# Patient Record
Sex: MALE | Race: WHITE | NOT HISPANIC OR LATINO | Employment: FULL TIME | URBAN - METROPOLITAN AREA
[De-identification: names, ages, dates, MRNs, and addresses within clinical notes are randomized per-mention and may not be internally consistent; named-entity substitution may affect disease eponyms.]

---

## 2017-02-08 ENCOUNTER — THERAPY VISIT (OUTPATIENT)
Dept: PHYSICAL THERAPY | Facility: CLINIC | Age: 52
End: 2017-02-08
Payer: OTHER MISCELLANEOUS

## 2017-02-08 DIAGNOSIS — M54.2 NECK PAIN: Primary | ICD-10-CM

## 2017-02-08 PROCEDURE — 97112 NEUROMUSCULAR REEDUCATION: CPT | Mod: GP | Performed by: PHYSICAL THERAPIST

## 2017-02-08 PROCEDURE — 97162 PT EVAL MOD COMPLEX 30 MIN: CPT | Mod: GP | Performed by: PHYSICAL THERAPIST

## 2017-02-08 PROCEDURE — 97110 THERAPEUTIC EXERCISES: CPT | Mod: GP | Performed by: PHYSICAL THERAPIST

## 2017-02-08 NOTE — PROGRESS NOTES
[facility] Initial Evaluation -- Cervical    Evaluation Date: February 8, 2017  Tim Her is a 51 year old male with a cervical spine condition.   Referral: Ortho  Work mechanical stresses: [place]  - sitting, driving, lifting/carrying  Employment status: Light duty, 4 hrs days, 30 lb lifting restriction and no lifting overhead  Leisure mechanical stresses: Walks 1 hr daily  Functional disability score (NDI):  See NDI  VAS score (0-10): 3-8/10 varying intensity    HISTORY:    Present symptoms:  (L) neck/scapula pain, numbness lateral arm to forearm, tingling 4th/5th fingers  Pain quality (sharp/shooting/stabbing/aching/burning/cramping):   aching.  Paresthesia (yes/no):  Yes    Present since: 9/13/16.    Symptoms (improving/unchanging/worsening):  unchanging.  Symptoms commenced as a result of: Road repairs at work   Condition occurred in the following environment:  work    Symptoms at onset (neck/arm/forearm/headache): neck  Constant symptoms (neck/arm/forearm/headache): arm/forearm hand tingling  Intermittent symptoms (neck/arm/forearm/headache): neck    Symptoms are made worse with the following: Always Turning and Sometimes Lying   Symptoms are made better with the following: chiropractic, heat/ice    Disturbed sleep (yes/no): No  Number of pillows: 1  Sleeping postures (prone/sup/side R/L): sides    Previous episodes (0/1-5/6-10/11+): 0 Year of first episode: NA    Previous history: No  Previous treatments: Chiropractic - minimal relief; DEIDRA - NE.    Specific Questions: (as reported by the patient)  Dizziness/Tinnitus/Nausea/Swallowing (pos/neg): Neg  Gait/Upper Limbs (normal/abnormal): Normal  Medications (nil/NSAIDS/anlag/steroids/anticoag/other):  Other - High blood pressure  Medical allergies:  None  General health (excellent/good/fair/poor):  Good  Pertinent medical history:  High blood pressure  Imaging (NA/Xray/MRI):  MRI  Recent or major surgery (yes/no): None  Night pain  (yes/no): No  Accidents (yes/no): No  Unexplained weight loss (yes/no): No  Barriers at home: None  Other red flags: None    EXAMINATION    Posture:   Sitting (good/fair/poor): Poor  Standing (good/fair/poor): Fair     Protruded head (yes/no): Yes    Wry Neck (right/left/nil):  Nil  Relevant (yes/no):  N     Correction of posture(better/worse/no effect): NE  Other observations:  NA    Neurological:    Motor Deficit:  WNL, except (L) thumb ext 4+/5   Reflexes:  WNL  Sensory Deficit:  Diminished (L) C6   Dural signs:  NA    Movement Loss:   Ramone Mod Min Nil Pain   Protrusion    X    Flexion    X (L) neck/scap   Retraction   X  (L) neck/scap   Extension  X   (L) neck/scap   Lateral flexion R    X (L) neck/scap   Lateral flexion L   X  (L) neck/scap   Rotation R    X (L) neck/scap   Rotation L   X  (L) neck/scap     Test Movements:   During: produces, abolishes, increases, decreases, no effect, centralizing, peripheralizing  After: better, worse, no better, no worse, no effect, centralized, peripheralized    Pretest symptoms sitting: (L) arm numbness/tingling 4th/5th fingers    Symptoms During Symptoms After ROM increased ROM decreased No Effect   PRO        Rep PRO        RET No Effect        Rep RET Decreases Better X     RET EXT Decreases and produces central neck/superior scapula       Rep RET EXT Decreases and produces central neck/superior scapula  Better (L) arm and fingers and  No worse neck/scapula X     Pretest symptoms lying:     Symptoms During Symptoms After ROM increased ROM decreased No Effect   RET        Rep RET        RET EXT        Rep RET EXT        If required, pretest symptoms sitting:      Symptoms During Symptoms After ROM increased ROM decreased No Effect   LF-R        Rep LF-R        LF-L        Rep LF-L        ROT-R        Rep ROT-R        ROT-L        Rep ROT-L        FLEX        Rep FLEX            Static Tests:   Protrusion:    Flexion:    Retraction:    Extension (sitting/prone/supine):   "    Other Tests:     Provisional Classification:  Derangement - Asymmetrical, unilateral, symptoms below elbow    Principle of Management:  Education:  Posture, centralization vs peripheralization, \"stop light guide\" w/performance of HEP  Equipment provided:  None.  Using rolled towel for lumbar roll/posture correction  Mechanical therapy (Y/N):  Y   Extension principle:  Ret/Ext x 10 reps every 2 hrs  Lateral principle:    Flexion principle:     Other:      ASSESSMENT/PLAN:    Patient is a 51 year old male with cervical complaints.    Patient has the following significant findings with corresponding treatment plan.                Diagnosis 1:  Neck Pain    Pain -  self management, education, directional preference exercise and home program  Decreased ROM/flexibility - manual therapy, therapeutic exercise and home program  Decreased joint mobility - manual therapy, therapeutic exercise and home program  Decreased strength - therapeutic exercise, therapeutic activities and home program  Impaired muscle performance - neuro re-education and home program  Decreased function - therapeutic activities and home program  Impaired posture - neuro re-education and home program    Therapy Evaluation Codes:   1) History comprised of:   Personal factors that impact the plan of care:      Work status.    Comorbidity factors that impact the plan of care are:      High blood pressure.     Medications impacting care: High blood pressure.  2) Examination of Body Systems comprised of:   Body structures and functions that impact the plan of care:      Cervical spine.   Activity limitations that impact the plan of care are:      Driving, Lifting, Sitting and Working.  3) Clinical presentation characteristics are:   Evolving/Changing.  4) Decision-Making    Moderate complexity using standardized patient assessment instrument and/or measureable assessment of functional outcome.  Cumulative Therapy Evaluation is: Moderate " complexity.    Previous and current functional limitations:  (See Goal Flow Sheet for this information)    Short term and Long term goals: (See Goal Flow Sheet for this information)     Communication ability:  Patient appears to be able to clearly communicate and understand verbal and written communication and follow directions correctly.  Treatment Explanation - The following has been discussed with the patient:   RX ordered/plan of care  Anticipated outcomes  Possible risks and side effects  This patient would benefit from PT intervention to resume normal activities.   Rehab potential is good.    Frequency:  1 X week, once daily  Duration:  for 6 weeks  Discharge Plan:  Achieve all LTG.  Independent in home treatment program.  Reach maximal therapeutic benefit.    Please refer to the daily flowsheet for treatment today, total treatment time and time spent performing 1:1 timed codes.

## 2017-02-13 ENCOUNTER — TELEPHONE (OUTPATIENT)
Dept: OTHER | Facility: CLINIC | Age: 52
End: 2017-02-13

## 2017-02-14 ENCOUNTER — THERAPY VISIT (OUTPATIENT)
Dept: PHYSICAL THERAPY | Facility: CLINIC | Age: 52
End: 2017-02-14
Payer: OTHER MISCELLANEOUS

## 2017-02-14 DIAGNOSIS — M54.2 NECK PAIN: ICD-10-CM

## 2017-02-14 PROCEDURE — 97012 MECHANICAL TRACTION THERAPY: CPT | Mod: GP | Performed by: PHYSICAL THERAPIST

## 2017-02-14 PROCEDURE — 97110 THERAPEUTIC EXERCISES: CPT | Mod: GP | Performed by: PHYSICAL THERAPIST

## 2017-02-23 ENCOUNTER — THERAPY VISIT (OUTPATIENT)
Dept: PHYSICAL THERAPY | Facility: CLINIC | Age: 52
End: 2017-02-23
Payer: OTHER MISCELLANEOUS

## 2017-02-23 DIAGNOSIS — M54.2 NECK PAIN: ICD-10-CM

## 2017-02-23 PROCEDURE — 97012 MECHANICAL TRACTION THERAPY: CPT | Mod: GP | Performed by: PHYSICAL THERAPY ASSISTANT

## 2017-02-23 PROCEDURE — 97110 THERAPEUTIC EXERCISES: CPT | Mod: GP | Performed by: PHYSICAL THERAPY ASSISTANT

## 2017-03-01 ENCOUNTER — THERAPY VISIT (OUTPATIENT)
Dept: PHYSICAL THERAPY | Facility: CLINIC | Age: 52
End: 2017-03-01
Payer: OTHER MISCELLANEOUS

## 2017-03-01 DIAGNOSIS — M54.2 NECK PAIN: ICD-10-CM

## 2017-03-01 PROCEDURE — 97110 THERAPEUTIC EXERCISES: CPT | Mod: GP | Performed by: PHYSICAL THERAPIST

## 2017-03-08 ENCOUNTER — THERAPY VISIT (OUTPATIENT)
Dept: PHYSICAL THERAPY | Facility: CLINIC | Age: 52
End: 2017-03-08
Payer: OTHER MISCELLANEOUS

## 2017-03-08 DIAGNOSIS — M54.2 NECK PAIN: ICD-10-CM

## 2017-03-08 PROCEDURE — 97110 THERAPEUTIC EXERCISES: CPT | Mod: GP | Performed by: PHYSICAL THERAPIST

## 2017-03-15 ENCOUNTER — TELEPHONE (OUTPATIENT)
Dept: PHYSICAL THERAPY | Facility: CLINIC | Age: 52
End: 2017-03-15

## 2017-03-15 ENCOUNTER — THERAPY VISIT (OUTPATIENT)
Dept: PHYSICAL THERAPY | Facility: CLINIC | Age: 52
End: 2017-03-15
Payer: OTHER MISCELLANEOUS

## 2017-03-15 DIAGNOSIS — M54.2 NECK PAIN: ICD-10-CM

## 2017-03-15 PROCEDURE — 97110 THERAPEUTIC EXERCISES: CPT | Mod: GP | Performed by: PHYSICAL THERAPIST

## 2017-03-15 NOTE — LETTER
[facility]YSICAL [facility]ERAP[facility]  [address]   [place] [address]  [phone]    March 15, 2017    Re: Tim Her   :   1965  MRN:  [id number]   REFERRING PHYSICIAN:   Dusty Arora @ [facility] & Katiana Thompson    [facility] PH[facility]    Date of Initial Evaluation:  2017  Visits:    6  Reason for Referral:  Neck pain    PROGRESS  REPORT:  Progress reporting period is from 17 to 03/15/17.       SUBJECTIVE  Subjective changes noted by patient:  Patient reports that there has not been any change in the N/T in his 4-5th digits or his forearm.  Continues to have intermittent pain on (L) side of neck and shoulder.  Overall, feels he has had no progress since coming to therapy.   Continues to perform HEP every 2 hours     Current Pain level: 4/10.  Initial Pain level:  (3-8/10).   Changes in function:  Yes (See Goal flowsheet attached for changes in current functional level).  Adverse reaction to treatment or activity: None    OBJECTIVE  Changes noted in objective findings:  Yes, slight improvement in C/S AROM  C/S AROM:  flex:  wnl (ERP neck/shoulder), ext: min loss (-),  retraction: min loss (ERP neck/shoulder), rot (R):  wnl (-), rot (L): min loss (ERP neck/shoulder), SB (R):  min loss (ERP neck/shoulder), SB (L): min loss (ERP neck/shoulder).    Myotomes:  all 5/5 except for (L) thumb ext: 5-/5.       ASSESSMENT/PLAN  Updated problem list and treatment plan: Diagnosis 1:  Neck Pain  Pain -  manual therapy, self management, education, directional preference exercise and home program  Decreased ROM/flexibility - manual therapy, therapeutic exercise and home program  Decreased strength - therapeutic exercise, therapeutic activities and home program  Decreased function - therapeutic activities and home program  STG/LTGs have been met or progress has been made towards goals:  Yes (See Goal flow sheet completed  today.)  Assessment of Progress: The patient's progress has slowed.  Self Management Plans:  Patient has been instructed in a home treatment program.  Patient is independent in a home treatment program.  Patient  has been instructed in self management of symptoms.      Re: Tim Her   :   1965    ASSESSMENT/PLAN (continued):  Patient is independent in self management of symptoms.  I have re-evaluated this patient and find that the nature, scope, duration and intensity of the therapy is appropriate for the medical condition of the patient.  Tim continues to require the following intervention to meet STG and LTG's:  PT    Recommendations:  This patient would benefit from continued therapy.     Frequency:  1 X week, once daily  Duration:  for 6 weeks    Thank you for your referral.    INQUIRIES        Therapist:   Gauri Worthington DPT, cert MDT  [facility] PHYSICAL THERAPY  [address] [facility] [address]  [facility] [address]  Phone: [phone]  Fax: [phone]

## 2017-03-15 NOTE — PROGRESS NOTES
Subjective:    HPI                    Objective:    System    Physical Exam    General     ROS    Assessment/Plan:      PROGRESS  REPORT    Progress reporting period is from 02/08/17 to 03/15/17.       SUBJECTIVE  Subjective changes noted by patient:  Patient reports that there has not been any change in the N/T in his 4-5th digits or his forearm.  Continues to have intermittent pain on (L) side of neck and shoulder.  Overall, feels he has had no progress since coming to therapy.   Continues to perform HEP every 2 hours     Current Pain level: 4/10.     Initial Pain level:  (3-8/10).   Changes in function:  Yes (See Goal flowsheet attached for changes in current functional level)  Adverse reaction to treatment or activity: None    OBJECTIVE  Changes noted in objective findings:  Yes, slight improvement in C/S AROM  C/S AROM:  flex:  wnl (ERP neck/shoulder), ext: min loss (-),  retraction: min loss (ERP neck/shoulder), rot (R):  wnl (-), rot (L): min loss (ERP neck/shoulder), SB (R):  min loss (ERP neck/shoulder), SB (L): min loss (ERP neck/shoulder).    Myotomes:  all 5/5 except for (L) thumb ext: 5-/5.       ASSESSMENT/PLAN  Updated problem list and treatment plan: Diagnosis 1:  Neck Pain  Pain -  manual therapy, self management, education, directional preference exercise and home program  Decreased ROM/flexibility - manual therapy, therapeutic exercise and home program  Decreased strength - therapeutic exercise, therapeutic activities and home program  Decreased function - therapeutic activities and home program  STG/LTGs have been met or progress has been made towards goals:  Yes (See Goal flow sheet completed today.)  Assessment of Progress: The patient's progress has slowed.  Self Management Plans:  Patient has been instructed in a home treatment program.  Patient is independent in a home treatment program.  Patient  has been instructed in self management of symptoms.  Patient is independent in self management  of symptoms.  I have re-evaluated this patient and find that the nature, scope, duration and intensity of the therapy is appropriate for the medical condition of the patient.  Tim continues to require the following intervention to meet STG and LTG's:  PT    Recommendations:  This patient would benefit from continued therapy.     Frequency:  1 X week, once daily  Duration:  for 6 weeks        Please refer to the daily flowsheet for treatment today, total treatment time and time spent performing 1:1 timed codes.

## 2017-03-22 ENCOUNTER — THERAPY VISIT (OUTPATIENT)
Dept: PHYSICAL THERAPY | Facility: CLINIC | Age: 52
End: 2017-03-22
Payer: OTHER MISCELLANEOUS

## 2017-03-22 DIAGNOSIS — M54.2 NECK PAIN: ICD-10-CM

## 2017-03-22 PROCEDURE — 97110 THERAPEUTIC EXERCISES: CPT | Mod: GP | Performed by: PHYSICAL THERAPIST

## 2017-03-29 ENCOUNTER — THERAPY VISIT (OUTPATIENT)
Dept: PHYSICAL THERAPY | Facility: CLINIC | Age: 52
End: 2017-03-29
Payer: OTHER MISCELLANEOUS

## 2017-03-29 DIAGNOSIS — M54.2 NECK PAIN: ICD-10-CM

## 2017-03-29 PROCEDURE — 97110 THERAPEUTIC EXERCISES: CPT | Mod: GP | Performed by: PHYSICAL THERAPIST

## 2017-05-15 DIAGNOSIS — T78.3XXA ANGIOEDEMA, INITIAL ENCOUNTER: ICD-10-CM

## 2017-05-15 NOTE — TELEPHONE ENCOUNTER
EPINEPHrine (EPIPEN) 0.3 MG/0.3ML injection      Last Written Prescription Date: 12-1-15  Last Fill Quantity: 2,  # refills: 1   Last Office Visit with [facility], [facility] or [facility] prescribing provider: 9-21-16

## 2017-05-16 RX ORDER — EPINEPHRINE 0.3 MG/.3ML
0.3 INJECTION SUBCUTANEOUS
Qty: 0.6 ML | Refills: 1 | Status: SHIPPED | OUTPATIENT
Start: 2017-05-16 | End: 2017-12-29

## 2017-05-17 PROBLEM — M54.2 NECK PAIN: Status: RESOLVED | Noted: 2017-02-08 | Resolved: 2017-05-17

## 2017-10-02 DIAGNOSIS — I10 ESSENTIAL HYPERTENSION WITH GOAL BLOOD PRESSURE LESS THAN 140/90: ICD-10-CM

## 2017-10-02 NOTE — TELEPHONE ENCOUNTER
hydrochlorothiazide (HYDRODIURIL) 25 MG tablet      Last Written Prescription Date: 9-21-16  Last Fill Quantity: 180, # refills: 3  Last Office Visit with [facility], [facility] or [facility] prescribing provider: 9-21-16       Potassium   Date Value Ref Range Status   09/21/2016 3.4 3.4 - 5.3 mmol/L Final     Creatinine   Date Value Ref Range Status   09/21/2016 1.04 0.66 - 1.25 mg/dL Final     BP Readings from Last 3 Encounters:   09/21/16 122/75   02/23/16 121/76   09/29/15 120/78     metoprolol (TOPROL-XL) 25 MG 24 hr tablet      Last Written Prescription Date: 9-21-16  Last Fill Quantity: 90, # refills: 3    Last Office Visit with [facility], [facility] or [facility] prescribing provider:  9-21-16   Future Office Visit:        BP Readings from Last 3 Encounters:   09/21/16 122/75   02/23/16 121/76   09/29/15 120/78

## 2017-10-03 RX ORDER — METOPROLOL SUCCINATE 25 MG/1
TABLET, EXTENDED RELEASE ORAL
Qty: 30 TABLET | Refills: 0 | Status: SHIPPED | OUTPATIENT
Start: 2017-10-03 | End: 2017-10-09

## 2017-10-03 RX ORDER — HYDROCHLOROTHIAZIDE 25 MG/1
TABLET ORAL
Qty: 60 TABLET | Refills: 0 | Status: SHIPPED | OUTPATIENT
Start: 2017-10-03 | End: 2017-10-09

## 2017-10-03 NOTE — TELEPHONE ENCOUNTER
Patient calling regarding refill request. Patient is completely out of hydrochlorothiazide. Patient did schedule annual physical for 12/12/17 while on the phone with TC. Routing as patient calls due to patient being out of medication.

## 2017-10-03 NOTE — TELEPHONE ENCOUNTER
RN still unable to sign under Hovda's name.    Routed to provider whom patient has physical scheduled with.      Alysia Ibanez RN  [facility]

## 2017-10-03 NOTE — TELEPHONE ENCOUNTER
Routing refill request to provider for review/approval because:  Patient needs to be seen because it has been more than 1 year since last office visit.  RN unable to approve as previous prescriber is retired.    Carmelina Dan RN  [facility]

## 2017-10-09 RX ORDER — METOPROLOL SUCCINATE 25 MG/1
25 TABLET, EXTENDED RELEASE ORAL DAILY
Qty: 90 TABLET | Refills: 0 | Status: SHIPPED | OUTPATIENT
Start: 2017-10-09 | End: 2017-12-29

## 2017-10-09 RX ORDER — HYDROCHLOROTHIAZIDE 25 MG/1
50 TABLET ORAL DAILY
Qty: 180 TABLET | Refills: 0 | Status: SHIPPED | OUTPATIENT
Start: 2017-10-09 | End: 2017-12-29

## 2017-10-09 NOTE — TELEPHONE ENCOUNTER
Patient did not receive the 3 month supply he was to get to last until physical scheduled in December. MERCEDEZ Jones

## 2017-12-29 ENCOUNTER — OFFICE VISIT (OUTPATIENT)
Dept: FAMILY MEDICINE | Facility: CLINIC | Age: 52
End: 2017-12-29
Payer: COMMERCIAL

## 2017-12-29 VITALS
SYSTOLIC BLOOD PRESSURE: 130 MMHG | TEMPERATURE: 97 F | WEIGHT: 185 LBS | HEART RATE: 61 BPM | DIASTOLIC BLOOD PRESSURE: 80 MMHG | BODY MASS INDEX: 32.78 KG/M2 | OXYGEN SATURATION: 98 %

## 2017-12-29 DIAGNOSIS — Z12.11 SPECIAL SCREENING FOR MALIGNANT NEOPLASMS, COLON: ICD-10-CM

## 2017-12-29 DIAGNOSIS — I10 ESSENTIAL HYPERTENSION WITH GOAL BLOOD PRESSURE LESS THAN 140/90: ICD-10-CM

## 2017-12-29 DIAGNOSIS — R97.20 ELEVATED PROSTATE SPECIFIC ANTIGEN (PSA): ICD-10-CM

## 2017-12-29 DIAGNOSIS — I10 HYPERTENSION GOAL BP (BLOOD PRESSURE) < 140/90: Primary | ICD-10-CM

## 2017-12-29 DIAGNOSIS — E78.5 HYPERLIPIDEMIA WITH TARGET LDL LESS THAN 130: ICD-10-CM

## 2017-12-29 DIAGNOSIS — T78.3XXA ANGIOEDEMA, INITIAL ENCOUNTER: ICD-10-CM

## 2017-12-29 DIAGNOSIS — Z11.59 ENCOUNTER FOR HCV SCREENING TEST FOR LOW RISK PATIENT: ICD-10-CM

## 2017-12-29 LAB
ALT SERPL W P-5'-P-CCNC: 30 U/L (ref 0–70)
ANION GAP SERPL CALCULATED.3IONS-SCNC: 9 MMOL/L (ref 3–14)
AST SERPL W P-5'-P-CCNC: 23 U/L (ref 0–45)
BUN SERPL-MCNC: 17 MG/DL (ref 7–30)
CALCIUM SERPL-MCNC: 9.6 MG/DL (ref 8.5–10.1)
CHLORIDE SERPL-SCNC: 100 MMOL/L (ref 94–109)
CHOLEST SERPL-MCNC: 219 MG/DL
CK SERPL-CCNC: 71 U/L (ref 30–300)
CO2 SERPL-SCNC: 32 MMOL/L (ref 20–32)
CREAT SERPL-MCNC: 0.95 MG/DL (ref 0.66–1.25)
GFR SERPL CREATININE-BSD FRML MDRD: 83 ML/MIN/1.7M2
GLUCOSE SERPL-MCNC: 102 MG/DL (ref 70–99)
HDLC SERPL-MCNC: 67 MG/DL
LDLC SERPL CALC-MCNC: 128 MG/DL
NONHDLC SERPL-MCNC: 152 MG/DL
POTASSIUM SERPL-SCNC: 3.6 MMOL/L (ref 3.4–5.3)
PSA SERPL-ACNC: 0.9 UG/L (ref 0–4)
SODIUM SERPL-SCNC: 141 MMOL/L (ref 133–144)
TRIGL SERPL-MCNC: 122 MG/DL

## 2017-12-29 PROCEDURE — 80048 BASIC METABOLIC PNL TOTAL CA: CPT | Performed by: FAMILY MEDICINE

## 2017-12-29 PROCEDURE — 86803 HEPATITIS C AB TEST: CPT | Performed by: FAMILY MEDICINE

## 2017-12-29 PROCEDURE — 84450 TRANSFERASE (AST) (SGOT): CPT | Performed by: FAMILY MEDICINE

## 2017-12-29 PROCEDURE — 82550 ASSAY OF CK (CPK): CPT | Performed by: FAMILY MEDICINE

## 2017-12-29 PROCEDURE — 84460 ALANINE AMINO (ALT) (SGPT): CPT | Performed by: FAMILY MEDICINE

## 2017-12-29 PROCEDURE — 36415 COLL VENOUS BLD VENIPUNCTURE: CPT | Performed by: FAMILY MEDICINE

## 2017-12-29 PROCEDURE — G0103 PSA SCREENING: HCPCS | Performed by: FAMILY MEDICINE

## 2017-12-29 PROCEDURE — 99213 OFFICE O/P EST LOW 20 MIN: CPT | Performed by: FAMILY MEDICINE

## 2017-12-29 PROCEDURE — 80061 LIPID PANEL: CPT | Performed by: FAMILY MEDICINE

## 2017-12-29 RX ORDER — HYDROCHLOROTHIAZIDE 25 MG/1
50 TABLET ORAL DAILY
Qty: 180 TABLET | Refills: 3 | Status: SHIPPED | OUTPATIENT
Start: 2017-12-29 | End: 2019-01-18

## 2017-12-29 RX ORDER — EPINEPHRINE 0.3 MG/.3ML
0.3 INJECTION SUBCUTANEOUS
Qty: 0.6 ML | Refills: 1 | Status: SHIPPED | OUTPATIENT
Start: 2017-12-29 | End: 2020-02-07

## 2017-12-29 RX ORDER — METOPROLOL SUCCINATE 25 MG/1
25 TABLET, EXTENDED RELEASE ORAL DAILY
Qty: 90 TABLET | Refills: 3 | Status: SHIPPED | OUTPATIENT
Start: 2017-12-29 | End: 2019-01-18

## 2017-12-29 NOTE — LETTER
[facility]   [address] [place]  [address]  [phone]                                   January 2, 2018    Tim Her  [address] [facility] [address]        Dear Tim,    Your basic metabolic panel which includes electrolytes,kidney function is normal and  -Glucose (diabetic screening test) is within normal limits, blood sugar was minimally elevated   Your cholesterols are ok   Your ldl is under 130 which is your goal   Your prostate test is now normal   Your liver tests and muscle enzyme tests are normal     Hepatitis c testing results are not back and we do not have your FIT results for the stool     Follow up in 6 month(s)    Results for orders placed or performed in visit on 12/29/17   Basic metabolic panel   Result Value Ref Range    Sodium 141 133 - 144 mmol/L    Potassium 3.6 3.4 - 5.3 mmol/L    Chloride 100 94 - 109 mmol/L    Carbon Dioxide 32 20 - 32 mmol/L    Anion Gap 9 3 - 14 mmol/L    Glucose 102 (H) 70 - 99 mg/dL    Urea Nitrogen 17 7 - 30 mg/dL    Creatinine 0.95 0.66 - 1.25 mg/dL    GFR Estimate 83 >60 mL/min/1.7m2    GFR Estimate If Black >90 >60 mL/min/1.7m2    Calcium 9.6 8.5 - 10.1 mg/dL   AST   Result Value Ref Range    AST 23 0 - 45 U/L   Lipid panel reflex to direct LDL Fasting   Result Value Ref Range    Cholesterol 219 (H) <200 mg/dL    Triglycerides 122 <150 mg/dL    HDL Cholesterol 67 >39 mg/dL    LDL Cholesterol Calculated 128 (H) <100 mg/dL    Non HDL Cholesterol 152 (H) <130 mg/dL   ALT   Result Value Ref Range    ALT 30 0 - 70 U/L   CK total   Result Value Ref Range    CK Total 71 30 - 300 U/L   Hepatitis C Screen Reflex to HCV RNA Quant and Genotype   Result Value Ref Range    Hepatitis C Antibody Nonreactive NR^Nonreactive   PSA, screen   Result Value Ref Range    PSA 0.90 0 - 4 ug/L       If you have any questions please call the clinic at [phone]    Sincerely,    Wisam Sylvester MD  bmd

## 2017-12-29 NOTE — PROGRESS NOTES
SUBJECTIVE:   Tim Her is a 52 year old male who presents to clinic today for the following health issues:      Hypertension Follow-up      Outpatient blood pressures are not being checked.    Low Salt Diet: no added salt        Amount of exercise or physical activity: Core strengthening 3 times a week and walking    Problems taking medications regularly: No    Medication side effects: none  Diet: no salt added    Ros: no chest pain, chest tightness   No sob   No leg edema   No abdominal pain     Denies fever or chills   Weight stable        No stroke or neurological symptoms     Current Outpatient Prescriptions   Medication Sig Dispense Refill     hydrochlorothiazide (HYDRODIURIL) 25 MG tablet Take 2 tablets (50 mg) by mouth daily 180 tablet 0     metoprolol (TOPROL-XL) 25 MG 24 hr tablet Take 1 tablet (25 mg) by mouth daily 90 tablet 0     amLODIPine (NORVASC) 10 MG tablet Take 1 tablet (10 mg) by mouth daily 90 tablet 3     Omega-3 Fatty Acids (OMEGA-3 FISH OIL PO) Take 2 capsules by mouth every morning and 1 capsules by mouth every evening       aspirin 81 MG tablet Take 1 tablet (81 mg) by mouth daily (Patient taking differently: Take 81 mg by mouth every evening ) 90 tablet 3     MULTI VITAMIN MENS PO TABS 1 TABLETby mouth every morning       EPINEPHrine 0.3 MG/0.3ML injection Inject 0.3 mLs (0.3 mg) into the muscle once as needed for anaphylaxis (Patient not taking: Reported on 12/29/2017) 0.6 mL 1     clotrimazole-betamethasone (LOTRISONE) cream Apply topically 2 times daily (Patient not taking: Reported on 12/29/2017) 15 g 1     Patient was having hemorrhoids when he had his FIT test done lat year and so he did not follow through with that     PSA   Date Value Ref Range Status   09/21/2016 1.75 0 - 4 ug/L Final     Comment:     Assay Method:  Chemiluminescence using Siemens Vista analyzer           O: /80 (BP Location: Left arm, Patient Position: Chair, Cuff Size: Adult Regular)  Pulse 61   Temp 97  F (36.1  C) (Oral)  Wt 185 lb (83.9 kg)  SpO2 98%  BMI 32.78 kg/m2    Chest wall normal to inspection and palpation. Good excursion bilaterally. Lungs clear to auscultation. Good air movement bilaterally without rales, wheezes, or rhonchi.   Regular rate and  rhythm. S1 and S2 normal, no murmurs, clicks, gallops or rubs. No edema or JVD.      ICD-10-CM    1. Hypertension goal BP (blood pressure) < 140/90 I10    2. Hyperlipidemia with target LDL less than 130 E78.5    3. Elevated prostate specific antigen (PSA) R97.20    4. Special screening for malignant neoplasms, colon Z12.11        Wt Readings from Last 4 Encounters:   12/29/17 185 lb (83.9 kg)   09/21/16 192 lb (87.1 kg)   02/23/16 198 lb (89.8 kg)   09/29/15 190 lb 8 oz (86.4 kg)

## 2017-12-29 NOTE — MR AVS SNAPSHOT
"              After Visit Summary   12/29/2017    Tim Her    MRN: [id number]           Patient Information     Date Of Birth          1965        Visit Information        Provider Department      12/29/2017 10:20 AM Wisam Sylvester MD [facility]        Today's Diagnoses     Hypertension goal BP (blood pressure) < 140/90    -  1    Hyperlipidemia with target LDL less than 130        Elevated prostate specific antigen (PSA)        Special screening for malignant neoplasms, colon        Essential hypertension with goal blood pressure less than 140/90        Encounter for HCV screening test for low risk patient        Angioedema, initial encounter           Follow-ups after your visit        Future tests that were ordered for you today     Open Future Orders        Priority Expected Expires Ordered    Fecal colorectal cancer screen (FIT) Routine 1/19/2018 3/23/2018 12/29/2017            Who to contact     If you have questions or need follow up information about today's clinic visit or your schedule please contact [facility] directly at [phone].  Normal or non-critical lab and imaging results will be communicated to you by MyChart, letter or phone within 4 business days after the clinic has received the results. If you do not hear from us within 7 days, please contact the clinic through [employer] or phone. If you have a critical or abnormal lab result, we will notify you by phone as soon as possible.  Submit refill requests through [employer] or call your pharmacy and they will forward the refill request to us. Please allow 3 business days for your refill to be completed.          Additional Information About Your Visit        [employer] Information     [employer] lets you send messages to your doctor, view your test results, renew your prescriptions, schedule appointments and more. To sign up, go to [email or web address] . Click on \"Log in\" on the left side " "of the screen, which will take you to the Welcome page. Then click on \"Sign up Now\" on the right side of the page.     You will be asked to enter the access code listed below, as well as some personal information. Please follow the directions to create your username and password.     Your access code is: [email or web address]  Expires: 3/19/2018 11:29 AM     Your access code will  in 90 days. If you need help or a new code, please call your [facility] or [phone].        Care EveryWhere ID     This is your Care EveryWhere ID. This could be used by other organizations to access your [facility] medical records  [id number]        Your Vitals Were     Pulse Temperature Pulse Oximetry BMI (Body Mass Index)          61 97  F (36.1  C) (Oral) 98% 32.78 kg/m2         Blood Pressure from Last 3 Encounters:   17 130/80   16 122/75   16 121/76    Weight from Last 3 Encounters:   17 185 lb (83.9 kg)   16 192 lb (87.1 kg)   16 198 lb (89.8 kg)              We Performed the Following     ALT     AST     Basic metabolic panel     CK total     Hepatitis C Screen Reflex to HCV RNA Quant and Genotype     Lipid panel reflex to direct LDL Fasting     PSA, screen          Today's Medication Changes          These changes are accurate as of: 17 10:47 AM.  If you have any questions, ask your nurse or doctor.               These medicines have changed or have updated prescriptions.        Dose/Directions    aspirin 81 MG tablet   This may have changed:  when to take this   Used for:  Routine general medical examination at a health care facility        Dose:  81 mg   Take 1 tablet (81 mg) by mouth daily   Quantity:  90 tablet   Refills:  3            Where to get your medicines      These medications were sent to [facility] [address] [place] [address]     Phone:  [phone]     hydrochlorothiazide 25 MG tablet    metoprolol 25 " MG 24 hr tablet         Some of these will need a paper prescription and others can be bought over the counter.  Ask your nurse if you have questions.     Bring a paper prescription for each of these medications     EPINEPHrine 0.3 MG/0.3ML injection 2-pack                Primary Care Provider Office Phone # Fax #    Zurdo Benites [phone]       [address] [facility] [address]        Equal Access to Services     RIVKA SHETH : Hadii aad ku hadasho Soomaali, waaxda luqadaha, qaybta kaalmada adeegyada, waxay idiin hayaan adeeg khmariangel laAshjulien . So [facility] [phone].    ATENCIÓN: Si habla español, tiene a ritter disposición servicios gratuitos de asistencia lingüística. Llame al [phone].    We comply with applicable federal civil rights laws and [place] laws. We do not discriminate on the basis of race, color, national origin, age, disability, sex, sexual orientation, or gender identity.            Thank you!     Thank you for choosing [facility]  for your care. Our goal is always to provide you with excellent care. Hearing back from our patients is one way we can continue to improve our services. Please take a few minutes to complete the written survey that you may receive in the mail after your visit with us. Thank you!             Your Updated Medication List - Protect others around you: Learn how to safely use, store and throw away your medicines at www.disposemymeds.org.          This list is accurate as of: 12/29/17 10:47 AM.  Always use your most recent med list.                   Brand Name Dispense Instructions for use Diagnosis    amLODIPine 10 MG tablet    NORVASC    90 tablet    Take 1 tablet (10 mg) by mouth daily    Essential hypertension with goal blood pressure less than 140/90       aspirin 81 MG tablet     90 tablet    Take 1 tablet (81 mg) by mouth daily    Routine general medical examination at a health care facility        clotrimazole-betamethasone cream    LOTRISONE    15 g    Apply topically 2 times daily    Tinea corporis       EPINEPHrine 0.3 MG/0.3ML injection 2-pack    EPIPEN/ADRENACLICK/or ANY BX GENERIC EQUIV    0.6 mL    Inject 0.3 mLs (0.3 mg) into the muscle once as needed for anaphylaxis    Angioedema, initial encounter       hydrochlorothiazide 25 MG tablet    HYDRODIURIL    180 tablet    Take 2 tablets (50 mg) by mouth daily    Essential hypertension with goal blood pressure less than 140/90       metoprolol 25 MG 24 hr tablet    TOPROL-XL    90 tablet    Take 1 tablet (25 mg) by mouth daily    Essential hypertension with goal blood pressure less than 140/90       MULTI vitamin  MENS Tabs      1 TABLETby mouth every morning        OMEGA-3 FISH OIL PO      Take 2 capsules by mouth every morning and 1 capsules by mouth every evening

## 2017-12-29 NOTE — NURSING NOTE
"Chief Complaint   Patient presents with     Hypertension     Health Maintenance     BMP, FIT       Initial /80 (BP Location: Left arm, Patient Position: Chair, Cuff Size: Adult Regular)  Pulse 61  Temp 97  F (36.1  C) (Oral)  Wt 185 lb (83.9 kg)  SpO2 98%  BMI 32.78 kg/m2 Estimated body mass index is 32.78 kg/(m^2) as calculated from the following:    Height as of 2/23/16: 5' 2.99\" (1.6 m).    Weight as of this encounter: 185 lb (83.9 kg).  Medication Reconciliation: complete   Lori See BRITTA Zhou      "

## 2017-12-29 NOTE — LETTER
[facility]   [address] [place]  [address]  [phone]                                   January 2, 2018    Tim Her  [address] [facility] [address]        Dear Tim,    Your hepatis c test is normal     Results for orders placed or performed in visit on 12/29/17   Basic metabolic panel   Result Value Ref Range    Sodium 141 133 - 144 mmol/L    Potassium 3.6 3.4 - 5.3 mmol/L    Chloride 100 94 - 109 mmol/L    Carbon Dioxide 32 20 - 32 mmol/L    Anion Gap 9 3 - 14 mmol/L    Glucose 102 (H) 70 - 99 mg/dL    Urea Nitrogen 17 7 - 30 mg/dL    Creatinine 0.95 0.66 - 1.25 mg/dL    GFR Estimate 83 >60 mL/min/1.7m2    GFR Estimate If Black >90 >60 mL/min/1.7m2    Calcium 9.6 8.5 - 10.1 mg/dL   AST   Result Value Ref Range    AST 23 0 - 45 U/L   Lipid panel reflex to direct LDL Fasting   Result Value Ref Range    Cholesterol 219 (H) <200 mg/dL    Triglycerides 122 <150 mg/dL    HDL Cholesterol 67 >39 mg/dL    LDL Cholesterol Calculated 128 (H) <100 mg/dL    Non HDL Cholesterol 152 (H) <130 mg/dL   ALT   Result Value Ref Range    ALT 30 0 - 70 U/L   CK total   Result Value Ref Range    CK Total 71 30 - 300 U/L   Hepatitis C Screen Reflex to HCV RNA Quant and Genotype   Result Value Ref Range    Hepatitis C Antibody Nonreactive NR^Nonreactive   PSA, screen   Result Value Ref Range    PSA 0.90 0 - 4 ug/L       If you have any questions please call the clinic at [phone]    Sincerely,    Wisam Sylvester MD  bmd

## 2018-01-02 LAB — HCV AB SERPL QL IA: NONREACTIVE

## 2018-01-02 NOTE — PROGRESS NOTES
Your basic metabolic panel which includes electrolytes,kidney function is normal and  -Glucose (diabetic screening test) is within normal limits, blood sugar was minimally elevated   Your cholesterols are ok   Your ldl is under 130 which is your goal   Your prostate test is now normal   Your liver tests and muscle enzyme tests are normal     Hepatitis c testing results are not back and we do not have your FIT results for the stool     Follow up in 6 month(s)

## 2018-01-19 DIAGNOSIS — Z12.11 SPECIAL SCREENING FOR MALIGNANT NEOPLASMS, COLON: ICD-10-CM

## 2018-01-19 LAB — HEMOCCULT STL QL IA: NEGATIVE

## 2018-01-19 PROCEDURE — 82274 ASSAY TEST FOR BLOOD FECAL: CPT | Performed by: FAMILY MEDICINE

## 2018-01-19 NOTE — LETTER
[facility]   [address] [place]  [address]  [phone]                                   January 22, 2018    Tim Her  [address] [facility] [address]        Dear Tim,    Your FIT testing was normal.   I still recommend that we do colonoscopy since it is the well established screening tool.   There are risks involved with doing a colonoscopy and so since you agreed to do FIT testing, you should continue to do it  yearly for five years.   You need to do it regularly to achieve the same level of confidence as the colonoscopy.  When done correctly, this can be substituted for the colonoscopy  If it turns positive then we would have to proceed with colonoscopy to further evaluate.     In reviewing your chart I see you had a positive FIT test 2 years ago and I am not sure if you had a colonoscopy.  This was recommended to you at the time and it is still recommended if it has not been done, even though your current test is normal.  Bleeding from the colon can be intermittent.  Any positive test requires a follow up colonoscopy unless it is already explained.    Results for orders placed or performed in visit on 01/19/18   Fecal colorectal cancer screen (FIT)   Result Value Ref Range    Occult Blood Scn FIT Negative NEG^Negative       If you have any questions please call the clinic at [phone]    Sincerely,    Wisam Sylvester MD  bmd

## 2018-01-22 NOTE — PROGRESS NOTES
Your FIT testing was normal.   I still recommend that we do colonoscopy since it is the well established screening tool.   There are risks involved with doing a colonoscopy and so since you agreed to do FIT testing, you should continue to do it  yearly for five years.   You need to do it regularly to achieve the same level of confidence as the colonoscopy.  When done correctly, this can be substituted for the colonoscopy  If it turns positive then we would have to proceed with colonoscopy to further evaluate.     In reviewing your chart I see you had a positive FIT test 2 years ago and I am not sure if you had a colonoscopy.  This was recommended to you at the time and it is still recommended if it has not been done, even though your current test is normal.  Bleeding from the colon can be intermittent.  Any positive test requires a follow up colonoscopy unless it is already explained.

## 2018-10-29 ENCOUNTER — TELEPHONE (OUTPATIENT)
Dept: ORTHOPEDICS | Facility: CLINIC | Age: 53
End: 2018-10-29

## 2018-10-29 NOTE — TELEPHONE ENCOUNTER
[facility] Call Center    Phone Message    May a detailed message be left on voicemail: yes    Reason for Call: Other: Pt requests call back either way. Pt wants to know if you do Stem Cell Injections. Please return Pt call. Thanks.      Action Taken: Message routed to:  [facility]opaedic Clinic

## 2018-11-12 NOTE — TELEPHONE ENCOUNTER
Called patient today and there was no answer so a voice mail was left. I informed him that we do not offer stem cell injections here at the [facility].     Omid Irizarry, ATC

## 2018-11-20 ENCOUNTER — TELEPHONE (OUTPATIENT)
Dept: ORTHOPEDICS | Facility: CLINIC | Age: 53
End: 2018-11-20

## 2018-11-20 NOTE — TELEPHONE ENCOUNTER
[facility] Call Center    Phone Message    May a detailed message be left on voicemail: yes    Reason for Call: Patient has questions about stem cell injections    Action Taken: Message routed to:  Adult Clinics: Sports Medicine p [phone]

## 2018-11-20 NOTE — TELEPHONE ENCOUNTER
Called to discuss with patient. Let him know that we currently do not offer stem cell injections here. He is interested in being seen for his neck pain. He does have an upcoming appointment with Dr. Fox on the 28th. He may cancel if he finds a provider that can offer stem cell treatments.

## 2019-01-15 ENCOUNTER — OFFICE VISIT (OUTPATIENT)
Dept: FAMILY MEDICINE | Facility: CLINIC | Age: 54
End: 2019-01-15
Payer: COMMERCIAL

## 2019-01-15 VITALS
WEIGHT: 197 LBS | BODY MASS INDEX: 34.91 KG/M2 | OXYGEN SATURATION: 97 % | DIASTOLIC BLOOD PRESSURE: 86 MMHG | HEART RATE: 62 BPM | SYSTOLIC BLOOD PRESSURE: 138 MMHG | TEMPERATURE: 96.7 F

## 2019-01-15 DIAGNOSIS — G89.29 CHRONIC PAIN OF RIGHT KNEE: Primary | ICD-10-CM

## 2019-01-15 DIAGNOSIS — M25.561 CHRONIC PAIN OF RIGHT KNEE: Primary | ICD-10-CM

## 2019-01-15 PROCEDURE — 99213 OFFICE O/P EST LOW 20 MIN: CPT | Performed by: FAMILY MEDICINE

## 2019-01-15 NOTE — PROGRESS NOTES
SUBJECTIVE:   Tim Her is a 53 year old male who presents to clinic today for the following health issues:    Follow up Right knee pain from about 5 years.  MRI was recommended, never went and now is interested on getting an MRI of his right knee.      Pain in the knee that is worse on some days     Aches on damp days     He is on light duty for the city     Goes to core strengthening   Walks several days a week     Does not run    Stairs work ok   Pain does not stop him from doing anything   No swelling   Does not give out on him     No previous injury   Works in construction     Past Medical History:   Diagnosis Date     DJD (degenerative joint disease)     BACK     High cholesterol      HTN (hypertension)      Hypertension goal BP (blood pressure) < 140/90 9/2/2011     Patient overweight      Seizures (H)        History reviewed. No pertinent surgical history.    Family History   Problem Relation Age of Onset     C.A.D. Father 50        mi, cabg     Hypertension Father      Diabetes No family hx of        Social History     Tobacco Use     Smoking status: Never Smoker     Smokeless tobacco: Never Used   Substance Use Topics     Alcohol use: Yes     Comment: socially         O: /86 (BP Location: Right arm, Patient Position: Sitting, Cuff Size: Adult Large)   Pulse 62   Temp 96.7  F (35.9  C) (Oral)   Wt 89.4 kg (197 lb)   SpO2 97%   BMI 34.91 kg/m      Medication Followup of All    Taking Medication as prescribed: yes    Side Effects:  None    Medication Helping Symptoms:  yes     Full rom of the knee from 0-160  No joint line pain   No swelloing   No warmth   José Miguel is normal     Gait is normal     Previous xray is about 4.5 years old       ICD-10-CM    1. Chronic pain of right knee M25.561     G89.29      Suggested we repeat his xray but he did not want to do this  Discussed MRI, but told him that when this is done it should direct therapy and patient does not want this therapy so we agreed  not to do that now.     Patient does not want to use any meds for this     Reviewed patellar strengthening exercises     Patient will return as needed

## 2019-01-29 ENCOUNTER — TELEPHONE (OUTPATIENT)
Dept: FAMILY MEDICINE | Facility: CLINIC | Age: 54
End: 2019-01-29

## 2019-01-29 DIAGNOSIS — I10 ESSENTIAL HYPERTENSION WITH GOAL BLOOD PRESSURE LESS THAN 140/90: ICD-10-CM

## 2019-01-30 NOTE — TELEPHONE ENCOUNTER
Routing refill request to provider for review/approval because:  Labs not current:  Creatinine.  Please review.  Thank you.  Rosaline Naejra RN

## 2019-01-31 RX ORDER — AMLODIPINE BESYLATE 10 MG/1
TABLET ORAL
Qty: 30 TABLET | Refills: 0 | Status: SHIPPED | OUTPATIENT
Start: 2019-01-31 | End: 2019-02-13

## 2019-01-31 RX ORDER — HYDROCHLOROTHIAZIDE 25 MG/1
TABLET ORAL
Qty: 60 TABLET | Refills: 0 | Status: SHIPPED | OUTPATIENT
Start: 2019-01-31 | End: 2019-02-13

## 2019-02-01 NOTE — TELEPHONE ENCOUNTER
Reason for Call:  Other appointment    Detailed comments: Patient is confused and frustrated about why he is needing another appointment when he just saw Dr. GREER for a medication refill appointment on 1/15. He is wondering if he can do a lab only appointment, and have Dr. GREER send him in more refills after that? Please call patient to advise    Phone Number Patient can be reached at: Home number on file [phone] (home)    Best Time: anytime    Can we leave a detailed message on this number? YES    Call taken on 2/1/2019 at 11:56 AM by Bill Leroy

## 2019-02-01 NOTE — TELEPHONE ENCOUNTER
Patient should be seen in at least yearly for his blood pressure check.  Patient came in for knee exam on the last visit.  Nothing related to his blood pressure was addressed other than his blood pressure was taken that day and was normal.  I would order a BMP.  He should come into the clinic within 6 months for a recheck in the office for a visit for blood pressure control.

## 2019-02-01 NOTE — TELEPHONE ENCOUNTER
Patient is due for a recheck of his bp and labs for the same   I have issued a 1 month supply.  If he fails to make an appointment he will be given a minimal supply to get him in

## 2019-02-01 NOTE — TELEPHONE ENCOUNTER
Notified patient of the message below per PCP.    Patient stated understanding and agreeable with the plan of care. Heidy Corley, RN-BSN, [facility] Triage.

## 2019-02-06 DIAGNOSIS — I10 ESSENTIAL HYPERTENSION WITH GOAL BLOOD PRESSURE LESS THAN 140/90: ICD-10-CM

## 2019-02-06 LAB
ANION GAP SERPL CALCULATED.3IONS-SCNC: 11 MMOL/L (ref 3–14)
BUN SERPL-MCNC: 11 MG/DL (ref 7–30)
CALCIUM SERPL-MCNC: 9 MG/DL (ref 8.5–10.1)
CHLORIDE SERPL-SCNC: 100 MMOL/L (ref 94–109)
CO2 SERPL-SCNC: 28 MMOL/L (ref 20–32)
CREAT SERPL-MCNC: 0.93 MG/DL (ref 0.66–1.25)
GFR SERPL CREATININE-BSD FRML MDRD: >90 ML/MIN/{1.73_M2}
GLUCOSE SERPL-MCNC: 126 MG/DL (ref 70–99)
POTASSIUM SERPL-SCNC: 3.4 MMOL/L (ref 3.4–5.3)
SODIUM SERPL-SCNC: 139 MMOL/L (ref 133–144)

## 2019-02-06 PROCEDURE — 36415 COLL VENOUS BLD VENIPUNCTURE: CPT | Performed by: FAMILY MEDICINE

## 2019-02-06 PROCEDURE — 80048 BASIC METABOLIC PNL TOTAL CA: CPT | Performed by: FAMILY MEDICINE

## 2019-02-06 NOTE — LETTER
[facility] [place]   [address] [place]  [address]  [phone]                                   February 7, 2019    Tim Her  [address] [place] [address]        Dear Tim,    Electrolytes are normal.  Kidney function tests are normal.  Blood glucose is 126 which is in the prediabetic range.  You should return to the clinic for fasting blood specimen to recheck this level.  If it continues to be elevated we might need to put you on medication.  Please do this within the next 2 weeks.    Results for orders placed or performed in visit on 02/06/19   Basic metabolic panel   Result Value Ref Range    Sodium 139 133 - 144 mmol/L    Potassium 3.4 3.4 - 5.3 mmol/L    Chloride 100 94 - 109 mmol/L    Carbon Dioxide 28 20 - 32 mmol/L    Anion Gap 11 3 - 14 mmol/L    Glucose 126 (H) 70 - 99 mg/dL    Urea Nitrogen 11 7 - 30 mg/dL    Creatinine 0.93 0.66 - 1.25 mg/dL    GFR Estimate >90 >60 mL/min/[1.73_m2]    GFR Estimate If Black >90 >60 mL/min/[1.73_m2]    Calcium 9.0 8.5 - 10.1 mg/dL       If you have any questions please call the clinic at [phone]    Sincerely,    Wisam Sylvester MD  bmd

## 2019-02-07 NOTE — RESULT ENCOUNTER NOTE
Electrolytes are normal.  Kidney function tests are normal.  Blood glucose is 126 which is in the prediabetic range.  You should return to the clinic for fasting blood specimen to recheck this level.  If it continues to be elevated we might need to put you on medication.  Please do this within the next 2 weeks.

## 2019-02-13 DIAGNOSIS — I10 ESSENTIAL HYPERTENSION WITH GOAL BLOOD PRESSURE LESS THAN 140/90: ICD-10-CM

## 2019-02-13 RX ORDER — HYDROCHLOROTHIAZIDE 25 MG/1
50 TABLET ORAL DAILY
Qty: 90 TABLET | Refills: 1 | Status: SHIPPED | OUTPATIENT
Start: 2019-02-13 | End: 2019-03-04

## 2019-02-13 RX ORDER — AMLODIPINE BESYLATE 10 MG/1
TABLET ORAL
Qty: 90 TABLET | Refills: 1 | Status: SHIPPED | OUTPATIENT
Start: 2019-02-13 | End: 2019-05-17

## 2019-02-13 RX ORDER — METOPROLOL SUCCINATE 25 MG/1
25 TABLET, EXTENDED RELEASE ORAL DAILY
Qty: 90 TABLET | Refills: 1 | Status: SHIPPED | OUTPATIENT
Start: 2019-02-13 | End: 2019-05-17

## 2019-03-04 DIAGNOSIS — I10 ESSENTIAL HYPERTENSION WITH GOAL BLOOD PRESSURE LESS THAN 140/90: ICD-10-CM

## 2019-03-05 RX ORDER — HYDROCHLOROTHIAZIDE 25 MG/1
50 TABLET ORAL DAILY
Qty: 180 TABLET | Refills: 0 | Status: SHIPPED | OUTPATIENT
Start: 2019-03-05 | End: 2019-05-17

## 2019-03-05 NOTE — TELEPHONE ENCOUNTER
Prescription approved per [facility] Refill Protocol.  Rosaline Najera RN

## 2019-05-07 ENCOUNTER — TELEPHONE (OUTPATIENT)
Dept: FAMILY MEDICINE | Facility: CLINIC | Age: 54
End: 2019-05-07

## 2019-05-07 DIAGNOSIS — Z12.11 SPECIAL SCREENING FOR MALIGNANT NEOPLASMS, COLON: Primary | ICD-10-CM

## 2019-05-07 NOTE — TELEPHONE ENCOUNTER
Reason for Call:  Call Back    Detailed comments: Patient would like a call back to discuss Apt from 1/15/19     Phone Number Patient can be reached at: Cell number on file:    Telephone Information:   Mobile [phone]       Best Time: Anytime    Can we leave a detailed message on this number? YES    Call taken on 5/7/2019 at 12:51 PM by Flaca Carter

## 2019-05-07 NOTE — TELEPHONE ENCOUNTER
Called patient at [phone] (home). Left message on voicemail to return phone call to triage line at [phone].  Heidy Corley RN [facility] Triage.

## 2019-05-08 NOTE — TELEPHONE ENCOUNTER
Called patient at [phone] (home). Left message on voicemail to return phone call to triage line at [phone].  Heidy Corley RN [facility] Triage.

## 2019-05-08 NOTE — TELEPHONE ENCOUNTER
Patient called clinic - nurse picked up - patient states is supposed to have a FIT test every year but when was in office in  January did not get it.    Nurse does not see order for FIT test for this year.    Patient did have positive FIT test in 2015.    Routing to PCP to review and advise.    Call patient after order has been entered and he will  test.      Mary Anne Peterson RN  [facility]

## 2019-05-09 NOTE — TELEPHONE ENCOUNTER
Attempted to call patient at home/mobile number, left message on voicemail; patient was instructed to return call to [facility] RN directly on the RN call back line at [phone]   Carmelina Dan RN  [facility]

## 2019-05-09 NOTE — TELEPHONE ENCOUNTER
Patient got a letter from Dr Benites when his test was positive.  Bleeding can be intermittent and with a positive test the recommendation is to have a colonoscopy, not repeat a FIT test.

## 2019-05-09 NOTE — TELEPHONE ENCOUNTER
If patient did not get his colonoscopy that should be ordered instead of the FIT test based on previous positive FIT test that was documented

## 2019-05-09 NOTE — TELEPHONE ENCOUNTER
Patient called back, I advised him FIT test now ordered.  Patient states he will come pick this up.    Carmelina Dan RN  [facility]

## 2019-05-09 NOTE — TELEPHONE ENCOUNTER
Called patient and relayed the message below. He stated back in 2015 he had bleeding hemorrhoids that contaminated the test and was then advised to do the repeat FIT testing yearly and if another came back positive he would need the colonoscopy. Patient stated he has not had a positive since.    Routed to provider.     Maribel Roland RN

## 2019-05-09 NOTE — TELEPHONE ENCOUNTER
Patient was last seen 1/15/19, due back around 7/15/19 for BP.    I called patient, he points out that his FIT test was negative the last 2 times (2016 and 2018).  He is sure the plan is to do yearly FIT tests and if comes up positive again then will have colonoscopy.    Routed to Dr. GREER to place order; please advise if colonoscopy still needed.    Carmelina Dan RN  [facility]

## 2019-05-14 ENCOUNTER — TELEPHONE (OUTPATIENT)
Dept: FAMILY MEDICINE | Facility: CLINIC | Age: 54
End: 2019-05-14

## 2019-05-14 NOTE — TELEPHONE ENCOUNTER
Reason for Call:  Other     Detailed comments: Patient would like a call back to discuss Knee Xray from 12/8/14 and referral.     Phone Number Patient can be reached at: Home number on file [phone] (home)    Best Time: Anytime    Can we leave a detailed message on this number? YES    Call taken on 5/14/2019 at 11:04 AM by Flaca Carter

## 2019-05-14 NOTE — TELEPHONE ENCOUNTER
Patient needs a plain xray of his knee in the clinic.  If this is normal, then we would consider an MRI.  Patient need to come in to have this done first

## 2019-05-14 NOTE — TELEPHONE ENCOUNTER
I called and spoke to patient and advised him he needs to be seen for this.    He states he is driving so will call back to schedule.    Carmelina Dan RN  [facility]

## 2019-05-14 NOTE — TELEPHONE ENCOUNTER
Attempt # 1  Called patient at home number.  Was call answered?  Yes, patient requesting referral for MRI - patient has decided if MRI shows needs surgery will look into getting it.  Also states his chiropractor advised to get the MRI    Routing to Dr. Sylvester to review and advise.      Mary Anne Peterson RN  [facility]

## 2019-05-14 NOTE — TELEPHONE ENCOUNTER
Office visit or just an x-ray?   No future order noted.    Patient was last seen for knee pain in January, see plan:                Instructions         Return in about 6 months (around 7/15/2019) for BP Recheck.       Routed to Dr. GREER to clarify.    Carmelina Dan, ANTHONY  [facility]

## 2019-05-16 DIAGNOSIS — I10 ESSENTIAL HYPERTENSION WITH GOAL BLOOD PRESSURE LESS THAN 140/90: ICD-10-CM

## 2019-05-16 NOTE — TELEPHONE ENCOUNTER
"Requested Prescriptions   Pending Prescriptions Disp Refills     amLODIPine (NORVASC) 10 MG tablet 90 tablet 1     Sig: TAKE 1 TABLET (10MG) BY MOUTH DAILY.   Last Written Prescription Date:  2/13/19  Last Fill Quantity: 90,  # refills: 1   Last office visit: 1/15/2019 with prescribing provider:     Future Office Visit:        Calcium Channel Blockers Protocol  Passed - 5/16/2019 11:47 AM        Passed - Blood pressure under 140/90 in past 12 months     BP Readings from Last 3 Encounters:   01/15/19 138/86   12/29/17 130/80   09/21/16 122/75                 Passed - Recent (12 mo) or future (30 days) visit within the authorizing provider's specialty     Patient had office visit in the last 12 months or has a visit in the next 30 days with authorizing provider or within the authorizing provider's specialty.  See \"Patient Info\" tab in inbasket, or \"Choose Columns\" in Meds & Orders section of the refill encounter.              Passed - Medication is active on med list        Passed - Patient is age 18 or older        Passed - Normal serum creatinine on file in past 12 months     Recent Labs   Lab Test 02/06/19  1146   CR 0.93             hydrochlorothiazide (HYDRODIURIL) 25 MG tablet 180 tablet 0     Sig: Take 2 tablets (50 mg) by mouth daily   Last Written Prescription Date:  3/5/19  Last Fill Quantity: 180,  # refills: 0   Last office visit: 1/15/2019 with prescribing provider:     Future Office Visit:        Diuretics (Including Combos) Protocol Passed - 5/16/2019 11:47 AM        Passed - Blood pressure under 140/90 in past 12 months     BP Readings from Last 3 Encounters:   01/15/19 138/86   12/29/17 130/80   09/21/16 122/75                 Passed - Recent (12 mo) or future (30 days) visit within the authorizing provider's specialty     Patient had office visit in the last 12 months or has a visit in the next 30 days with authorizing provider or within the authorizing provider's specialty.  See \"Patient Info\" tab " "in inbasket, or \"Choose Columns\" in Meds & Orders section of the refill encounter.              Passed - Medication is active on med list        Passed - Patient is age 18 or older        Passed - Normal serum creatinine on file in past 12 months     Recent Labs   Lab Test 02/06/19  1146   CR 0.93              Passed - Normal serum potassium on file in past 12 months     Recent Labs   Lab Test 02/06/19  1146   POTASSIUM 3.4                    Passed - Normal serum sodium on file in past 12 months     Recent Labs   Lab Test 02/06/19  1146                 metoprolol succinate ER (TOPROL-XL) 25 MG 24 hr tablet 90 tablet 1     Sig: Take 1 tablet (25 mg) by mouth daily   Last Written Prescription Date:  2/13/19  Last Fill Quantity: 90,  # refills: 1   Last office visit: 1/15/2019 with prescribing provider:     Future Office Visit:        Beta-Blockers Protocol Passed - 5/16/2019 11:47 AM        Passed - Blood pressure under 140/90 in past 12 months     BP Readings from Last 3 Encounters:   01/15/19 138/86   12/29/17 130/80   09/21/16 122/75                 Passed - Patient is age 6 or older        Passed - Recent (12 mo) or future (30 days) visit within the authorizing provider's specialty     Patient had office visit in the last 12 months or has a visit in the next 30 days with authorizing provider or within the authorizing provider's specialty.  See \"Patient Info\" tab in inbasket, or \"Choose Columns\" in Meds & Orders section of the refill encounter.              Passed - Medication is active on med list          "

## 2019-05-16 NOTE — TELEPHONE ENCOUNTER
Reason for Call:  Medication or medication refill:    Do you use a [facility]?  Name of the pharmacy and phone number for the current request:  [facility], [address], [phone]    Name of the medication requested: hydrocholorthiazide    Other request: amLOdipine, and metopolol succinate    Can we leave a detailed message on this number? YES    Phone number patient can be reached at: Cell number on file:    Telephone Information:   Mobile [phone]       Best Time: anytime    Call taken on 5/16/2019 at 11:40 AM by Lidia Langford

## 2019-05-17 RX ORDER — HYDROCHLOROTHIAZIDE 25 MG/1
50 TABLET ORAL DAILY
Qty: 180 TABLET | Refills: 0 | Status: SHIPPED | OUTPATIENT
Start: 2019-05-17 | End: 2019-10-16

## 2019-05-17 RX ORDER — METOPROLOL SUCCINATE 25 MG/1
25 TABLET, EXTENDED RELEASE ORAL DAILY
Qty: 90 TABLET | Refills: 1 | Status: SHIPPED | OUTPATIENT
Start: 2019-05-17 | End: 2020-01-20

## 2019-05-17 RX ORDER — AMLODIPINE BESYLATE 10 MG/1
TABLET ORAL
Qty: 90 TABLET | Refills: 1 | Status: SHIPPED | OUTPATIENT
Start: 2019-05-17 | End: 2020-01-20

## 2019-06-14 ENCOUNTER — TELEPHONE (OUTPATIENT)
Dept: FAMILY MEDICINE | Facility: CLINIC | Age: 54
End: 2019-06-14

## 2019-06-14 NOTE — TELEPHONE ENCOUNTER
Reason for Call:  Other prescription    Detailed comments: Patient was having trouble getting prescriptions refilled. He states that his medications need to have 9 more months of refills added to them. Please call to discuss with patient.   Patient received a letter from  that the provider would be able to issue a years worth of hypertension medication. (copy of form from patient was put into providers mail box)    1. metoprolol succinate ER (TOPROL-XL) 25 MG 24 hr tablet   2. amLODIPine (NORVASC) 10 MG tablet    3. hydrochlorothiazide (HYDRODIURIL) 25 MG tablet - Patient states that this prescription should be for 180 tablets since he takes 2 per day.       Phone Number Patient can be reached at: Home number on file [phone] (home)    Best Time: any    Can we leave a detailed message on this number? YES    Call taken on 6/14/2019 at 2:36 PM by Leslee Jin

## 2019-06-18 DIAGNOSIS — Z12.11 SPECIAL SCREENING FOR MALIGNANT NEOPLASMS, COLON: ICD-10-CM

## 2019-06-18 PROCEDURE — 82274 ASSAY TEST FOR BLOOD FECAL: CPT | Performed by: FAMILY MEDICINE

## 2019-06-18 NOTE — LETTER
[facility]   [address] [place]  [address]  [phone]                                   June 26, 2019    Tim Her  [address] [facility] [address]        Dear Tim,    Your FIT testing was normal.   I still recommend that we do colonoscopy since it is the well established screening tool.   There are risks involved with doing a colonoscopy and so since you agreed to do FIT testing, you should continue to do it  yearly for five years.   You need to do it regularly to achieve the same level of confidence as the colonoscopy.  When done correctly, this can be substituted for the colonoscopy  If it turns positive then we would have to proceed with colonoscopy to further evaluate.     Results for orders placed or performed in visit on 06/18/19   Fecal colorectal cancer screen (FIT)   Result Value Ref Range    Occult Blood Scn FIT Negative NEG^Negative       If you have any questions please call the clinic at [phone]    Sincerely,    Wisam Sylvester MD  bmd

## 2019-06-18 NOTE — LETTER
[facility]   [address] [place]  [address]  [phone]                                   June 19, 2019    Tim Her  [address] [facility] [address]        Dear Tim,    negative test     Results for orders placed or performed in visit on 06/18/19   Fecal colorectal cancer screen (FIT)   Result Value Ref Range    Occult Blood Scn FIT Negative NEG^Negative       If you have any questions please call the clinic at [phone]    Sincerely,    Tyrell Solano MD  bmd

## 2019-06-19 LAB — HEMOCCULT STL QL IA: NEGATIVE

## 2019-10-16 DIAGNOSIS — I10 ESSENTIAL HYPERTENSION WITH GOAL BLOOD PRESSURE LESS THAN 140/90: ICD-10-CM

## 2019-10-16 RX ORDER — HYDROCHLOROTHIAZIDE 25 MG/1
50 TABLET ORAL DAILY
Qty: 180 TABLET | Refills: 0 | Status: SHIPPED | OUTPATIENT
Start: 2019-10-16 | End: 2020-01-23

## 2019-10-16 RX ORDER — HYDROCHLOROTHIAZIDE 25 MG/1
50 TABLET ORAL DAILY
Qty: 180 TABLET | Refills: 0 | Status: SHIPPED | OUTPATIENT
Start: 2019-10-16 | End: 2020-02-07

## 2019-10-16 NOTE — TELEPHONE ENCOUNTER
Prescription approved per [facility] Refill Protocol.  Wayne Can RN

## 2019-10-16 NOTE — TELEPHONE ENCOUNTER
"Requested Prescriptions   Pending Prescriptions Disp Refills     hydrochlorothiazide (HYDRODIURIL) 25 MG tablet 180 tablet 0     Sig: Take 2 tablets (50 mg) by mouth daily   Last Written Prescription Date:  5/17/19  Last Fill Quantity: 180,  # refills: 0   Last office visit: 1/15/2019 with prescribing provider:    Future Office Visit:        Diuretics (Including Combos) Protocol Passed - 10/16/2019 10:59 AM        Passed - Blood pressure under 140/90 in past 12 months     BP Readings from Last 3 Encounters:   01/15/19 138/86   12/29/17 130/80   09/21/16 122/75                 Passed - Recent (12 mo) or future (30 days) visit within the authorizing provider's specialty     Patient has had an office visit with the authorizing provider or a provider within the authorizing providers department within the previous 12 mos or has a future within next 30 days. See \"Patient Info\" tab in inbasket, or \"Choose Columns\" in Meds & Orders section of the refill encounter.              Passed - Medication is active on med list        Passed - Patient is age 18 or older        Passed - Normal serum creatinine on file in past 12 months     Recent Labs   Lab Test 02/06/19  1146   CR 0.93              Passed - Normal serum potassium on file in past 12 months     Recent Labs   Lab Test 02/06/19  1146   POTASSIUM 3.4                    Passed - Normal serum sodium on file in past 12 months     Recent Labs   Lab Test 02/06/19  1146                   "

## 2020-01-18 ENCOUNTER — TELEPHONE (OUTPATIENT)
Dept: FAMILY MEDICINE | Facility: CLINIC | Age: 55
End: 2020-01-18

## 2020-01-18 DIAGNOSIS — I10 ESSENTIAL HYPERTENSION WITH GOAL BLOOD PRESSURE LESS THAN 140/90: ICD-10-CM

## 2020-01-20 NOTE — TELEPHONE ENCOUNTER
"Requested Prescriptions   Pending Prescriptions Disp Refills     hydrochlorothiazide (HYDRODIURIL) 25 MG tablet [Pharmacy Med Name: hydroCHLOROthiazide Oral Tablet 25 MG] 180 tablet 0     Sig: Take 2 tablets (50 mg) by mouth daily   Last Written Prescription Date:  10/16/19  Last Fill Quantity: 180,  # refills: 0   Last office visit: 1/15/2019 with prescribing provider:     Future Office Visit:        Diuretics (Including Combos) Protocol Failed - 1/18/2020  7:01 AM        Failed - Blood pressure under 140/90 in past 12 months     BP Readings from Last 3 Encounters:   01/15/19 138/86   12/29/17 130/80   09/21/16 122/75                 Failed - Recent (12 mo) or future (30 days) visit within the authorizing provider's specialty     Patient has had an office visit with the authorizing provider or a provider within the authorizing providers department within the previous 12 mos or has a future within next 30 days. See \"Patient Info\" tab in inbasket, or \"Choose Columns\" in Meds & Orders section of the refill encounter.              Passed - Medication is active on med list        Passed - Patient is age 18 or older        Passed - Normal serum creatinine on file in past 12 months     Recent Labs   Lab Test 02/06/19  1146   CR 0.93              Passed - Normal serum potassium on file in past 12 months     Recent Labs   Lab Test 02/06/19  1146   POTASSIUM 3.4                    Passed - Normal serum sodium on file in past 12 months     Recent Labs   Lab Test 02/06/19  1146                   "

## 2020-01-23 RX ORDER — HYDROCHLOROTHIAZIDE 25 MG/1
50 TABLET ORAL DAILY
Qty: 60 TABLET | Refills: 0 | Status: SHIPPED | OUTPATIENT
Start: 2020-01-23 | End: 2020-02-07

## 2020-01-23 NOTE — TELEPHONE ENCOUNTER
"Requested Prescriptions   Pending Prescriptions Disp Refills     hydrochlorothiazide (HYDRODIURIL) 25 MG tablet [Pharmacy Med Name: hydroCHLOROthiazide Oral Tablet 25 MG] 180 tablet 0     Sig: Take 2 tablets (50 mg) by mouth daily       Diuretics (Including Combos) Protocol Failed - 1/23/2020  2:33 PM        Failed - Blood pressure under 140/90 in past 12 months     BP Readings from Last 3 Encounters:   01/15/19 138/86   12/29/17 130/80   09/21/16 122/75                 Passed - Recent (12 mo) or future (30 days) visit within the authorizing provider's specialty     Patient has had an office visit with the authorizing provider or a provider within the authorizing providers department within the previous 12 mos or has a future within next 30 days. See \"Patient Info\" tab in inbasket, or \"Choose Columns\" in Meds & Orders section of the refill encounter.              Passed - Medication is active on med list        Passed - Patient is age 18 or older        Passed - Normal serum creatinine on file in past 12 months     Recent Labs   Lab Test 02/06/19  1146   CR 0.93              Passed - Normal serum potassium on file in past 12 months     Recent Labs   Lab Test 02/06/19  1146   POTASSIUM 3.4                    Passed - Normal serum sodium on file in past 12 months     Recent Labs   Lab Test 02/06/19  1146                 Prescription approved per [facility] Refill Protocol. BP passes protocol    Roseline Crane, RN, BSN, PHN  [facility]: [place]    "

## 2020-02-07 ENCOUNTER — OFFICE VISIT (OUTPATIENT)
Dept: FAMILY MEDICINE | Facility: CLINIC | Age: 55
End: 2020-02-07
Payer: COMMERCIAL

## 2020-02-07 VITALS
HEIGHT: 63 IN | TEMPERATURE: 97.8 F | BODY MASS INDEX: 34.91 KG/M2 | DIASTOLIC BLOOD PRESSURE: 82 MMHG | WEIGHT: 197 LBS | SYSTOLIC BLOOD PRESSURE: 137 MMHG | HEART RATE: 70 BPM

## 2020-02-07 DIAGNOSIS — Z00.00 ROUTINE GENERAL MEDICAL EXAMINATION AT A HEALTH CARE FACILITY: ICD-10-CM

## 2020-02-07 DIAGNOSIS — T78.3XXA ANGIOEDEMA, INITIAL ENCOUNTER: ICD-10-CM

## 2020-02-07 DIAGNOSIS — I10 ESSENTIAL HYPERTENSION WITH GOAL BLOOD PRESSURE LESS THAN 140/90: Primary | ICD-10-CM

## 2020-02-07 DIAGNOSIS — B35.4 TINEA CORPORIS: ICD-10-CM

## 2020-02-07 LAB
ANION GAP SERPL CALCULATED.3IONS-SCNC: 6 MMOL/L (ref 3–14)
BUN SERPL-MCNC: 12 MG/DL (ref 7–30)
CALCIUM SERPL-MCNC: 10 MG/DL (ref 8.5–10.1)
CHLORIDE SERPL-SCNC: 102 MMOL/L (ref 94–109)
CO2 SERPL-SCNC: 30 MMOL/L (ref 20–32)
CREAT SERPL-MCNC: 0.72 MG/DL (ref 0.66–1.25)
GFR SERPL CREATININE-BSD FRML MDRD: >90 ML/MIN/{1.73_M2}
GLUCOSE SERPL-MCNC: 100 MG/DL (ref 70–99)
POTASSIUM SERPL-SCNC: 3.3 MMOL/L (ref 3.4–5.3)
PSA SERPL-ACNC: 1.04 UG/L (ref 0–4)
SODIUM SERPL-SCNC: 138 MMOL/L (ref 133–144)

## 2020-02-07 PROCEDURE — 87389 HIV-1 AG W/HIV-1&-2 AB AG IA: CPT | Performed by: FAMILY MEDICINE

## 2020-02-07 PROCEDURE — G0103 PSA SCREENING: HCPCS | Performed by: FAMILY MEDICINE

## 2020-02-07 PROCEDURE — 36415 COLL VENOUS BLD VENIPUNCTURE: CPT | Performed by: FAMILY MEDICINE

## 2020-02-07 PROCEDURE — 80048 BASIC METABOLIC PNL TOTAL CA: CPT | Performed by: FAMILY MEDICINE

## 2020-02-07 PROCEDURE — 99214 OFFICE O/P EST MOD 30 MIN: CPT | Performed by: FAMILY MEDICINE

## 2020-02-07 RX ORDER — EPINEPHRINE 0.3 MG/.3ML
0.3 INJECTION SUBCUTANEOUS
Qty: 0.6 ML | Refills: 1 | Status: SHIPPED | OUTPATIENT
Start: 2020-02-07 | End: 2020-12-02

## 2020-02-07 RX ORDER — METOPROLOL SUCCINATE 25 MG/1
25 TABLET, EXTENDED RELEASE ORAL DAILY
Qty: 90 TABLET | Refills: 3 | Status: SHIPPED | OUTPATIENT
Start: 2020-02-07 | End: 2020-12-02

## 2020-02-07 RX ORDER — AMLODIPINE BESYLATE 10 MG/1
TABLET ORAL
Qty: 90 TABLET | Refills: 3 | Status: SHIPPED | OUTPATIENT
Start: 2020-02-07 | End: 2020-12-02

## 2020-02-07 RX ORDER — PRENATAL VIT 91/IRON/FOLIC/DHA 28-975-200
COMBINATION PACKAGE (EA) ORAL
Qty: 84 G | Refills: 0 | Status: SHIPPED | OUTPATIENT
Start: 2020-02-07 | End: 2020-06-16

## 2020-02-07 RX ORDER — HYDROCHLOROTHIAZIDE 25 MG/1
50 TABLET ORAL DAILY
Qty: 180 TABLET | Refills: 3 | Status: SHIPPED | OUTPATIENT
Start: 2020-02-07 | End: 2020-12-02

## 2020-02-07 ASSESSMENT — MIFFLIN-ST. JEOR: SCORE: 1614.75

## 2020-02-07 NOTE — LETTER
[facility]   [address] [place]  [address]  [phone]                                   February 10, 2020    Tim Her  [address] [facility] [address]        Dear Tim,    Negative for HIV, normal for prostate-specific antigen,  Normal for kidney function, his potassium in the low side.  Could be related to his hydrochlorothiazide.  Advised patient to eat more potassium rich diet.  We will have repeat his lab in a few weeks.  Please have patient make a lab appointment in 2 weeks, or so.    Results for orders placed or performed in visit on 02/07/20   HIV Screening     Status: None   Result Value Ref Range    HIV Antigen Antibody Combo Nonreactive NR^Nonreactive       BASIC METABOLIC PANEL     Status: Abnormal   Result Value Ref Range    Sodium 138 133 - 144 mmol/L    Potassium 3.3 (L) 3.4 - 5.3 mmol/L    Chloride 102 94 - 109 mmol/L    Carbon Dioxide 30 20 - 32 mmol/L    Anion Gap 6 3 - 14 mmol/L    Glucose 100 (H) 70 - 99 mg/dL    Urea Nitrogen 12 7 - 30 mg/dL    Creatinine 0.72 0.66 - 1.25 mg/dL    GFR Estimate >90 >60 mL/min/[1.73_m2]    GFR Estimate If Black >90 >60 mL/min/[1.73_m2]    Calcium 10.0 8.5 - 10.1 mg/dL   PSA, screen     Status: None   Result Value Ref Range    PSA 1.04 0 - 4 ug/L       If you have any questions please call the clinic at [phone]    Sincerely,    Tyrell Solano MD  bmd

## 2020-02-07 NOTE — PROGRESS NOTES
Subjective     Tim Her is a 54 year old male who presents to clinic today for the following health issues:    HPI     Refill on Hydrochlorothiazide, Metoprolol and Amlodipine  History of hypertension, morbid obesity.  He reports chronic low back pain and neck pain.  Comes in to have his medication renewed.  Concern for dry scaling rash is been noted under his armpit right more so than the left.  Been present for over 2 years now.  Does not itch, does not bleed  Otherwise, denies recent sickness does not smoke.  Denies lower extremity edema.    Medication Followup of hydrochlorothiazide, Metoprolol and Amlodipine    Taking Medication as prescribed: yes    Side Effects:  None    Medication Helping Symptoms:  yes     Past Medical History:   Diagnosis Date     DJD (degenerative joint disease)     BACK     High cholesterol      HTN (hypertension)      Hypertension goal BP (blood pressure) < 140/90 9/2/2011     Patient overweight      Seizures (H)      History reviewed. No pertinent surgical history.  Family History   Problem Relation Age of Onset     C.A.D. Father 50        mi, cabg     Hypertension Father      Diabetes No family hx of      Counseling given: Not Answered    Reviewed and updated as needed this visit by Provider         Review of Systems   ROS COMP: Constitutional, HEENT, cardiovascular, pulmonary, gi and gu systems are negative, except as otherwise noted.      Objective    There were no vitals taken for this visit.  There is no height or weight on file to calculate BMI.  Physical Exam   GENERAL: healthy, alert and no distress  RESP: lungs clear to auscultation - no rales, rhonchi or wheezes  CV: regular rate and rhythm, normal S1 S2, no S3 or S4, no murmur, click or rub, no peripheral edema and peripheral pulses strong  ABDOMEN: soft, nontender, no hepatosplenomegaly, no masses and bowel sounds normal  MS: no gross musculoskeletal defects noted, no edema  SKIN: erythema - under armpit  PSYCH:  "mentation appears normal, affect normal/bright    Diagnostic Test Results:  Labs reviewed in Epic  Orders Placed This Encounter   Procedures     HIV Screening     BASIC METABOLIC PANEL     Fecal colorectal cancer screen (FIT)     Standing Status:   Future     Standing Expiration Date:   2/7/2021     PSA, screen           Assessment & Plan     1. Essential hypertension with goal blood pressure less than 140/90    - HIV Screening  - BASIC METABOLIC PANEL  - amLODIPine (NORVASC) 10 MG tablet; One tab daily  Dispense: 90 tablet; Refill: 3  - hydrochlorothiazide (HYDRODIURIL) 25 MG tablet; Take 2 tablets (50 mg) by mouth daily  Dispense: 180 tablet; Refill: 3  - metoprolol succinate ER (TOPROL-XL) 25 MG 24 hr tablet; Take 1 tablet (25 mg) by mouth daily  Dispense: 90 tablet; Refill: 3    2. Routine general medical examination at a health care facility    - aspirin (ASA) 81 MG tablet; Take 1 tablet (81 mg) by mouth daily  Dispense: 90 tablet; Refill: 3  - Fecal colorectal cancer screen (FIT); Future  - PSA, screen    3. Angioedema, initial encounter    - EPINEPHrine (EPIPEN/ADRENACLICK/OR ANY BX GENERIC EQUIV) 0.3 MG/0.3ML injection 2-pack; Inject 0.3 mLs (0.3 mg) into the muscle once as needed for anaphylaxis  Dispense: 0.6 mL; Refill: 1    4. Tinea corporis  Advised with good hygiene, keep the area clean and dry.  - terbinafine (LAMISIL) 1 % external cream; Apply to area bid for 14 days 9 please give 2 tubes )  Dispense: 84 g; Refill: 0     BMI:   Estimated body mass index is 35.18 kg/m  as calculated from the following:    Height as of this encounter: 1.594 m (5' 2.75\").    Weight as of this encounter: 89.4 kg (197 lb).   Weight management plan: Discussed healthy diet and exercise guidelines        There are no Patient Instructions on file for this visit.    Return in about 1 year (around 2/7/2021) for Physical Exam.    Tyrell Solano MD  [facility]        "

## 2020-02-10 DIAGNOSIS — I10 ESSENTIAL HYPERTENSION WITH GOAL BLOOD PRESSURE LESS THAN 140/90: Primary | ICD-10-CM

## 2020-02-10 LAB — HIV 1+2 AB+HIV1 P24 AG SERPL QL IA: NONREACTIVE

## 2020-02-12 DIAGNOSIS — Z00.00 ROUTINE GENERAL MEDICAL EXAMINATION AT A HEALTH CARE FACILITY: ICD-10-CM

## 2020-02-12 PROCEDURE — 82274 ASSAY TEST FOR BLOOD FECAL: CPT | Performed by: FAMILY MEDICINE

## 2020-02-13 LAB — HEMOCCULT STL QL IA: NEGATIVE

## 2020-02-17 ENCOUNTER — TELEPHONE (OUTPATIENT)
Dept: FAMILY MEDICINE | Facility: CLINIC | Age: 55
End: 2020-02-17

## 2020-02-17 NOTE — TELEPHONE ENCOUNTER
Prior Authorization Retail Medication Request    Medication/Dose: Epinephrine 0.3mg/0.3ml Auto-Injectors  ICD code (if different than what is on RX):    Previously Tried and Failed:    Rationale:      Insurance Name:  E-MEDICA/MEDICA CHOICE  Insurance ID:  [id number]      Pharmacy Information (if different than what is on RX)  Name:  [facility]  Phone:  [phone]      [facility]

## 2020-02-19 NOTE — TELEPHONE ENCOUNTER
PA Initiation    Medication: Epinephrine 0.3mg/0.3ml Auto-Injectors  Insurance Company: [employer] - Phone [phone] Fax [phone]  Pharmacy Filling the Rx: [facility] [address] [place] [address]  Filling Pharmacy Phone: [phone]  Filling Pharmacy Fax:    Start Date: 2/19/2020    Central Prior Authorization Team   Phone: [phone]

## 2020-02-20 NOTE — TELEPHONE ENCOUNTER
Prior Authorization Approval    Authorization Effective Date: 1/20/2020  Authorization Expiration Date: 2/18/2021  Medication: Epinephrine 0.3mg/0.3ml Auto-Injectors  Approved Dose/Quantity: 2  Reference #:   [id number]  Insurance Company: [employer] - Phone [phone] Fax [phone]  Which Pharmacy is filling the prescription (Not needed for infusion/clinic administered): [facility] [address] [place] [address]  Pharmacy Notified: Yes  Patient Notified:  **Instructed pharmacy to notify patient when script is ready to /ship.**

## 2020-03-23 ENCOUNTER — TELEPHONE (OUTPATIENT)
Dept: FAMILY MEDICINE | Facility: CLINIC | Age: 55
End: 2020-03-23

## 2020-03-23 NOTE — TELEPHONE ENCOUNTER
I called patient this morning, left him a message.  He stopped by the clinic last Friday, March 20 regarding his recent office visit on February 7, 2020.  He reports he has been charged for his visit.  When I saw the patient,  came for blood pressure follow-up, medication refill.  At that time he had requested to do blood test.  I asked the patient we could do it under a physical exam.  But he needs to call his health insurance to see if that will be covered.  I called the patient left a message explaining the above 10.  In addition I gave him the phone number for the billing department, [phone].

## 2020-06-15 DIAGNOSIS — B35.4 TINEA CORPORIS: ICD-10-CM

## 2020-06-15 NOTE — TELEPHONE ENCOUNTER
Last Written Prescription Date:  2-7-2020  Last Fill Quantity: 84g,  # refills: 0   Last office visit: 2/7/2020 with prescribing provider:  2-7-2020   Future Office Visit:  Requested Prescriptions   Pending Prescriptions Disp Refills     terbinafine (LAMISIL) 1 % external cream 84 g 0     Sig: Apply to area bid for 14 days 9 please give 2 tubes )       There is no refill protocol information for this order

## 2020-06-16 RX ORDER — PRENATAL VIT 91/IRON/FOLIC/DHA 28-975-200
COMBINATION PACKAGE (EA) ORAL
Qty: 84 G | Refills: 0 | Status: SHIPPED | OUTPATIENT
Start: 2020-06-16 | End: 2022-01-28

## 2020-12-02 ENCOUNTER — OFFICE VISIT (OUTPATIENT)
Dept: FAMILY MEDICINE | Facility: CLINIC | Age: 55
End: 2020-12-02
Payer: COMMERCIAL

## 2020-12-02 VITALS
OXYGEN SATURATION: 97 % | SYSTOLIC BLOOD PRESSURE: 137 MMHG | BODY MASS INDEX: 35.43 KG/M2 | DIASTOLIC BLOOD PRESSURE: 85 MMHG | WEIGHT: 192.5 LBS | HEART RATE: 81 BPM | TEMPERATURE: 97.6 F | HEIGHT: 62 IN

## 2020-12-02 DIAGNOSIS — H91.93 DECREASED HEARING OF BOTH EARS: ICD-10-CM

## 2020-12-02 DIAGNOSIS — I10 ESSENTIAL HYPERTENSION WITH GOAL BLOOD PRESSURE LESS THAN 140/90: ICD-10-CM

## 2020-12-02 DIAGNOSIS — M25.561 CHRONIC PAIN OF RIGHT KNEE: ICD-10-CM

## 2020-12-02 DIAGNOSIS — G89.29 CHRONIC PAIN OF RIGHT KNEE: ICD-10-CM

## 2020-12-02 DIAGNOSIS — T78.3XXA ANGIOEDEMA, INITIAL ENCOUNTER: ICD-10-CM

## 2020-12-02 DIAGNOSIS — M50.30 DDD (DEGENERATIVE DISC DISEASE), CERVICAL: ICD-10-CM

## 2020-12-02 DIAGNOSIS — Z00.00 ROUTINE GENERAL MEDICAL EXAMINATION AT A HEALTH CARE FACILITY: Primary | ICD-10-CM

## 2020-12-02 LAB
ALBUMIN SERPL-MCNC: 4.6 G/DL (ref 3.4–5)
ALP SERPL-CCNC: 85 U/L (ref 40–150)
ALT SERPL W P-5'-P-CCNC: 46 U/L (ref 0–70)
ANION GAP SERPL CALCULATED.3IONS-SCNC: 8 MMOL/L (ref 3–14)
AST SERPL W P-5'-P-CCNC: 34 U/L (ref 0–45)
BASOPHILS # BLD AUTO: 0 10E9/L (ref 0–0.2)
BASOPHILS NFR BLD AUTO: 0.6 %
BILIRUB SERPL-MCNC: 0.6 MG/DL (ref 0.2–1.3)
BUN SERPL-MCNC: 10 MG/DL (ref 7–30)
CALCIUM SERPL-MCNC: 9.4 MG/DL (ref 8.5–10.1)
CHLORIDE SERPL-SCNC: 99 MMOL/L (ref 94–109)
CHOLEST SERPL-MCNC: 243 MG/DL
CO2 SERPL-SCNC: 29 MMOL/L (ref 20–32)
CREAT SERPL-MCNC: 0.76 MG/DL (ref 0.66–1.25)
DIFFERENTIAL METHOD BLD: NORMAL
EOSINOPHIL # BLD AUTO: 0.1 10E9/L (ref 0–0.7)
EOSINOPHIL NFR BLD AUTO: 0.8 %
ERYTHROCYTE [DISTWIDTH] IN BLOOD BY AUTOMATED COUNT: 12.4 % (ref 10–15)
GFR SERPL CREATININE-BSD FRML MDRD: >90 ML/MIN/{1.73_M2}
GLUCOSE SERPL-MCNC: 109 MG/DL (ref 70–99)
HCT VFR BLD AUTO: 42.8 % (ref 40–53)
HDLC SERPL-MCNC: 76 MG/DL
HGB BLD-MCNC: 14.8 G/DL (ref 13.3–17.7)
LDLC SERPL CALC-MCNC: 146 MG/DL
LYMPHOCYTES # BLD AUTO: 1.2 10E9/L (ref 0.8–5.3)
LYMPHOCYTES NFR BLD AUTO: 16.1 %
MCH RBC QN AUTO: 31.6 PG (ref 26.5–33)
MCHC RBC AUTO-ENTMCNC: 34.6 G/DL (ref 31.5–36.5)
MCV RBC AUTO: 92 FL (ref 78–100)
MONOCYTES # BLD AUTO: 0.6 10E9/L (ref 0–1.3)
MONOCYTES NFR BLD AUTO: 8 %
NEUTROPHILS # BLD AUTO: 5.4 10E9/L (ref 1.6–8.3)
NEUTROPHILS NFR BLD AUTO: 74.5 %
NONHDLC SERPL-MCNC: 167 MG/DL
PLATELET # BLD AUTO: 297 10E9/L (ref 150–450)
POTASSIUM SERPL-SCNC: 3.3 MMOL/L (ref 3.4–5.3)
PROT SERPL-MCNC: 8.1 G/DL (ref 6.8–8.8)
RBC # BLD AUTO: 4.68 10E12/L (ref 4.4–5.9)
SODIUM SERPL-SCNC: 136 MMOL/L (ref 133–144)
TRIGL SERPL-MCNC: 104 MG/DL
WBC # BLD AUTO: 7.2 10E9/L (ref 4–11)

## 2020-12-02 PROCEDURE — 99213 OFFICE O/P EST LOW 20 MIN: CPT | Mod: 25 | Performed by: FAMILY MEDICINE

## 2020-12-02 PROCEDURE — 36415 COLL VENOUS BLD VENIPUNCTURE: CPT | Performed by: FAMILY MEDICINE

## 2020-12-02 PROCEDURE — 80053 COMPREHEN METABOLIC PANEL: CPT | Performed by: FAMILY MEDICINE

## 2020-12-02 PROCEDURE — 99396 PREV VISIT EST AGE 40-64: CPT | Performed by: FAMILY MEDICINE

## 2020-12-02 PROCEDURE — 80061 LIPID PANEL: CPT | Performed by: FAMILY MEDICINE

## 2020-12-02 PROCEDURE — 85025 COMPLETE CBC W/AUTO DIFF WBC: CPT | Performed by: FAMILY MEDICINE

## 2020-12-02 RX ORDER — HYDROCHLOROTHIAZIDE 25 MG/1
50 TABLET ORAL DAILY
Qty: 180 TABLET | Refills: 3 | Status: SHIPPED | OUTPATIENT
Start: 2020-12-02 | End: 2022-01-28

## 2020-12-02 RX ORDER — EPINEPHRINE 0.3 MG/.3ML
0.3 INJECTION SUBCUTANEOUS
Qty: 0.6 ML | Refills: 1 | Status: SHIPPED | OUTPATIENT
Start: 2020-12-02 | End: 2022-01-28

## 2020-12-02 RX ORDER — METOPROLOL SUCCINATE 25 MG/1
25 TABLET, EXTENDED RELEASE ORAL DAILY
Qty: 90 TABLET | Refills: 3 | Status: SHIPPED | OUTPATIENT
Start: 2020-12-02 | End: 2022-01-28

## 2020-12-02 RX ORDER — AMLODIPINE BESYLATE 10 MG/1
TABLET ORAL
Qty: 90 TABLET | Refills: 3 | Status: SHIPPED | OUTPATIENT
Start: 2020-12-02 | End: 2022-01-28

## 2020-12-02 ASSESSMENT — ENCOUNTER SYMPTOMS
DIZZINESS: 0
EYE PAIN: 0
HEMATURIA: 0
COUGH: 0
CHILLS: 0
NERVOUS/ANXIOUS: 0
FEVER: 0
DIARRHEA: 0
HEMATOCHEZIA: 0
ABDOMINAL PAIN: 0
CONSTIPATION: 0
FREQUENCY: 0

## 2020-12-02 ASSESSMENT — MIFFLIN-ST. JEOR: SCORE: 1580.67

## 2020-12-02 NOTE — PROGRESS NOTES
SUBJECTIVE:   Tim Her is a 55 year old male who presents for Preventive Visit.    Patient comes for an annual physical exam, history of hypertension.     Chronic knee pain, been present for years.  Patient had an x-ray in 2014 was normal at that time.  Since that time he continued to have pain, swelling on and off.  Weakness, sometimes difficult for him.    History of degenerative disc disease in the neck, low back.    Concern of decreased hearing.  Denies any ringing in the ears.  Has no fullness no pressure.  No sinus congestion.  No recent sickness.    Recent blood work normal for PSA, negative for blood in the stool.    Patient has been advised of split billing requirements and indicates understanding: Yes   Are you in the first 12 months of your Medicare coverage?  No    Healthy Habits:     Getting at least 3 servings of Calcium per day:  Yes    Bi-annual eye exam:  NO    Dental care twice a year:  Yes    Sleep apnea or symptoms of sleep apnea:  None    Diet:  Low salt    Frequency of exercise:  2-3 days/week    Duration of exercise:  45-60 minutes    Taking medications regularly:  Yes    Barriers to taking medications:  Not applicable    Medication side effects:  None    PHQ-2 Total Score: 0    Additional concerns today:  Yes (right knee bothered him every day, need MRI and hearing issue suggested by his girlfriend.)  Imm/Inj  Pertinent negatives include no abdominal pain, chest pain, chills, congestion, coughing or fever.     Do you feel safe in your environment? Yes    Have you ever done Advance Care Planning? (For example, a Health Directive, POLST, or a discussion with a medical provider or your loved ones about your wishes): No, advance care planning information given to patient to review.  Patient declined advance care planning discussion at this time.      Fall risk  Fallen 2 or more times in the past year?: No  Any fall with injury in the past year?: No    Cognitive Screening   1) Repeat 3  items (Leader, Season, Table)  : yes  2) Clock draw: NORMAL  3) 3 item recall: Recalls   Results: NORMAL clock, 0 items recalled    Mini-CogTM Copyright ADINA Tomas. Licensed by the author for use in [facility]; reprinted with permission (anish@[facility]). All rights reserved.      Do you have sleep apnea, excessive snoring or daytime drowsiness?: no    Reviewed and updated as needed this visit by clinical staff  Tobacco  Allergies  Meds   Med Hx  Surg Hx  Fam Hx  Soc Hx        Reviewed and updated as needed this visit by Provider                Social History     Tobacco Use     Smoking status: Never Smoker     Smokeless tobacco: Never Used   Substance Use Topics     Alcohol use: Yes     Comment: socially     If you drink alcohol do you typically have >3 drinks per day or >7 drinks per week? No    Alcohol Use 12/2/2020   Prescreen: >3 drinks/day or >7 drinks/week? No   Prescreen: >3 drinks/day or >7 drinks/week? -   No flowsheet data found.    Current providers sharing in care for this patient include:   Patient Care Team:  Wisam Sylvester MD as PCP - General (Family Practice)  Tyrell Solano MD as Assigned PCP    The following health maintenance items are reviewed in Epic and correct as of today:  Health Maintenance   Topic Date Due     ZOSTER IMMUNIZATION (1 of 2) 11/01/2013     PREVENTIVE CARE VISIT  03/24/2015     INFLUENZA VACCINE (1) 09/01/2020     BMP  02/07/2021     COLORECTAL CANCER SCREENING  02/12/2021     LIPID  12/29/2022     DTAP/TDAP/TD IMMUNIZATION (2 - Td) 03/24/2024     ADVANCE CARE PLANNING  12/02/2025     HEPATITIS C SCREENING  Completed     HIV SCREENING  Completed     PHQ-2  Completed     Pneumococcal Vaccine: Pediatrics (0 to 5 Years) and At-Risk Patients (6 to 64 Years)  Aged Out     IPV IMMUNIZATION  Aged Out     MENINGITIS IMMUNIZATION  Aged Out     HEPATITIS B IMMUNIZATION  Aged Out     BP Readings from Last 3 Encounters:   12/02/20 137/85   02/07/20 137/82   01/15/19  138/86    Wt Readings from Last 3 Encounters:   12/02/20 87.3 kg (192 lb 8 oz)   02/07/20 89.4 kg (197 lb)   01/15/19 89.4 kg (197 lb)                  Patient Active Problem List   Diagnosis     Hypertension goal BP (blood pressure) < 140/90     Hyperlipidemia with target LDL less than 130     Elevated prostate specific antigen (PSA)     Obesity     DDD (degenerative disc disease), cervical     History reviewed. No pertinent surgical history.    Social History     Tobacco Use     Smoking status: Never Smoker     Smokeless tobacco: Never Used   Substance Use Topics     Alcohol use: Yes     Comment: socially     Family History   Problem Relation Age of Onset     C.A.D. Father 50        mi, cabg     Hypertension Father      Diabetes No family hx of          Current Outpatient Medications   Medication Sig Dispense Refill     amLODIPine (NORVASC) 10 MG tablet One tab daily 90 tablet 3     aspirin (ASA) 81 MG tablet Take 1 tablet (81 mg) by mouth daily 90 tablet 3     EPINEPHrine (ANY BX GENERIC EQUIV) 0.3 MG/0.3ML injection 2-pack Inject 0.3 mLs (0.3 mg) into the muscle once as needed for anaphylaxis 0.6 mL 1     hydrochlorothiazide (HYDRODIURIL) 25 MG tablet Take 2 tablets (50 mg) by mouth daily 180 tablet 3     metoprolol succinate ER (TOPROL-XL) 25 MG 24 hr tablet Take 1 tablet (25 mg) by mouth daily 90 tablet 3     MULTI VITAMIN MENS PO TABS 1 TABLETby mouth every morning       KRILL OIL PO Take by mouth daily       terbinafine (LAMISIL) 1 % external cream Apply to area bid for 14 days 9 please give 2 tubes ) (Patient not taking: Reported on 12/2/2020) 84 g 0     Allergies   Allergen Reactions     Flurbiprofen Itching     Lisinopril      angioedema       Review of Systems   Constitutional: Negative for chills and fever.   HENT: Negative for congestion and ear pain.    Eyes: Negative for pain.   Respiratory: Negative for cough.    Cardiovascular: Negative for chest pain.   Gastrointestinal: Negative for abdominal  "pain, constipation, diarrhea and hematochezia.   Genitourinary: Negative for frequency, genital sores and hematuria.   Neurological: Negative for dizziness.   Psychiatric/Behavioral: The patient is not nervous/anxious.      Constitutional, HEENT, cardiovascular, pulmonary, GI, , musculoskeletal, neuro, skin, endocrine and psych systems are negative, except as otherwise noted.    OBJECTIVE:   /85 (BP Location: Right arm, Patient Position: Chair, Cuff Size: Adult Regular)   Pulse 81   Temp 97.6  F (36.4  C) (Oral)   Ht 1.58 m (5' 2.21\")   Wt 87.3 kg (192 lb 8 oz)   SpO2 97%   BMI 34.98 kg/m   Estimated body mass index is 34.98 kg/m  as calculated from the following:    Height as of this encounter: 1.58 m (5' 2.21\").    Weight as of this encounter: 87.3 kg (192 lb 8 oz).  Physical Exam  GENERAL: healthy, alert and no distress  HENT: ear canals and TM's normal, nose and mouth without ulcers or lesions  NECK: no adenopathy, no asymmetry, masses, or scars and thyroid normal to palpation  RESP: lungs clear to auscultation - no rales, rhonchi or wheezes  CV: regular rate and rhythm, normal S1 S2, no S3 or S4, no murmur, click or rub, no peripheral edema and peripheral pulses strong  ABDOMEN: soft, nontender, no hepatosplenomegaly, no masses and bowel sounds normal  MS: no gross musculoskeletal defects noted, no edema  SKIN: no suspicious lesions or rashes  NEURO: Normal strength and tone, mentation intact and speech normal  BACK: no CVA tenderness, no paralumbar tenderness  PSYCH: mentation appears normal, affect normal/bright  Right knee exam tenderness at the medial aspect, full extension, flexion.  No swelling.    Diagnostic Test Results:  Labs reviewed in Epic  Orders Placed This Encounter   Procedures     MR Knee Right w/o Contrast     Lipid panel reflex to direct LDL Fasting     Comprehensive metabolic panel (BMP + Alb, Alk Phos, ALT, AST, Total. Bili, TP)     CBC with platelets and differential     " "OTOLARYNGOLOGY REFERRAL       ASSESSMENT / PLAN:       ICD-10-CM    1. Routine general medical examination at a health care facility  Z00.00 Lipid panel reflex to direct LDL Fasting     Comprehensive metabolic panel (BMP + Alb, Alk Phos, ALT, AST, Total. Bili, TP)     CBC with platelets and differential   2. Essential hypertension with goal blood pressure less than 140/90  I10 metoprolol succinate ER (TOPROL-XL) 25 MG 24 hr tablet     hydrochlorothiazide (HYDRODIURIL) 25 MG tablet     amLODIPine (NORVASC) 10 MG tablet   3. Angioedema, initial encounter  T78.3XXA EPINEPHrine (ANY BX GENERIC EQUIV) 0.3 MG/0.3ML injection 2-pack   4. Chronic pain of right knee  M25.561 MR Knee Right w/o Contrast    G89.29    5. Decreased hearing of both ears  H91.93 OTOLARYNGOLOGY REFERRAL   6. DDD (degenerative disc disease), cervical  M50.30      #1 annual physical exam.  Discussed routine preventative care.  Up-to-date with his prostate cancer screening, colon cancer screening.  Declined influenza vaccine, zoster vaccine.  Discussed diet, exercise, weight loss.  Will obtain a lipid panel, CMP today.    2.  Right knee pain, chronic patient will be scheduled to have an MRI.  Advised with supportive care.    3.  Decreased hearing, patient was referred to have a hearing testing and evaluation.    For all other chronic medical problems history of hypertension been stable renewed his medication, obtain a CMP today.  Patient has been advised of split billing requirements and indicates understanding: Yes  COUNSELING:  Reviewed preventive health counseling, as reflected in patient instructions       Regular exercise       Healthy diet/nutrition       Vision screening       Hearing screening       Immunizations    Declined: Influenza and Zoster due to Concerns about side effects/safety            Estimated body mass index is 34.98 kg/m  as calculated from the following:    Height as of this encounter: 1.58 m (5' 2.21\").    Weight as of this " encounter: 87.3 kg (192 lb 8 oz).    Weight management plan: Discussed healthy diet and exercise guidelines    He reports that he has never smoked. He has never used smokeless tobacco.      Appropriate preventive services were discussed with this patient, including applicable screening as appropriate for cardiovascular disease, diabetes, osteopenia/osteoporosis, and glaucoma.  As appropriate for age/gender, discussed screening for colorectal cancer, prostate cancer, breast cancer, and cervical cancer. Checklist reviewing preventive services available has been given to the patient.    Reviewed patients plan of care and provided an AVS. The Basic Care Plan (routine screening as documented in Health Maintenance) for Tim meets the Care Plan requirement. This Care Plan has been established and reviewed with the Patient.    Counseling Resources:  ATP IV Guidelines  Pooled Cohorts Equation Calculator  Breast Cancer Risk Calculator  Breast Cancer: Medication to Reduce Risk  FRAX Risk Assessment  ICSI Preventive Guidelines  Dietary Guidelines for Americans, 2010  USDA's MyPlate  ASA Prophylaxis  Lung CA Screening    Tyrell Solano MD  [facility]    Identified Health Risks:

## 2020-12-02 NOTE — LETTER
[facility]   [address] [place]  [address]  [phone]                                   December 3, 2020    Tim Her  [address] [facility] [place] [address]        Dear Tim,    Total cholesterol, LDL on high side.  Advised patient with diet, exercise, weight loss.   I have sent a new prescription to his pharmacy, Lipitor 1 tablet at bedtime.   Follow-up in 12 to 15 weeks to repeat labs.  Please come fasting     Normal for blood sugar, normal for kidney function, normal for liver enzyme.   Potassium was in the low side.  Please try to eat more rich potassium diet.   Such as, 1 banana a day, or purines , or raising.   Normal for CBC     Results for orders placed or performed in visit on 12/02/20   Lipid panel reflex to direct LDL Fasting     Status: Abnormal   Result Value Ref Range    Cholesterol 243 (H) <200 mg/dL    Triglycerides 104 <150 mg/dL    HDL Cholesterol 76 >39 mg/dL    LDL Cholesterol Calculated 146 (H) <100 mg/dL    Non HDL Cholesterol 167 (H) <130 mg/dL   Comprehensive metabolic panel (BMP + Alb, Alk Phos, ALT, AST, Total. Bili, TP)     Status: Abnormal   Result Value Ref Range    Sodium 136 133 - 144 mmol/L    Potassium 3.3 (L) 3.4 - 5.3 mmol/L    Chloride 99 94 - 109 mmol/L    Carbon Dioxide 29 20 - 32 mmol/L    Anion Gap 8 3 - 14 mmol/L    Glucose 109 (H) 70 - 99 mg/dL    Urea Nitrogen 10 7 - 30 mg/dL    Creatinine 0.76 0.66 - 1.25 mg/dL    GFR Estimate >90 >60 mL/min/[1.73_m2]    GFR Estimate If Black >90 >60 mL/min/[1.73_m2]    Calcium 9.4 8.5 - 10.1 mg/dL    Bilirubin Total 0.6 0.2 - 1.3 mg/dL    Albumin 4.6 3.4 - 5.0 g/dL    Protein Total 8.1 6.8 - 8.8 g/dL    Alkaline Phosphatase 85 40 - 150 U/L    ALT 46 0 - 70 U/L    AST 34 0 - 45 U/L   CBC with platelets and differential     Status: None   Result Value Ref Range    WBC 7.2 4.0 - 11.0 10e9/L    RBC Count 4.68 4.4 - 5.9 10e12/L    Hemoglobin 14.8 13.3 - 17.7 g/dL    Hematocrit 42.8 40.0 -  53.0 %    MCV 92 78 - 100 fl    MCH 31.6 26.5 - 33.0 pg    MCHC 34.6 31.5 - 36.5 g/dL    RDW 12.4 10.0 - 15.0 %    Platelet Count 297 150 - 450 10e9/L    % Neutrophils 74.5 %    % Lymphocytes 16.1 %    % Monocytes 8.0 %    % Eosinophils 0.8 %    % Basophils 0.6 %    Absolute Neutrophil 5.4 1.6 - 8.3 10e9/L    Absolute Lymphocytes 1.2 0.8 - 5.3 10e9/L    Absolute Monocytes 0.6 0.0 - 1.3 10e9/L    Absolute Eosinophils 0.1 0.0 - 0.7 10e9/L    Absolute Basophils 0.0 0.0 - 0.2 10e9/L    Diff Method Automated Method        If you have any questions please call the clinic at [phone]    Sincerely,    Tyrell Solano MD  bmd

## 2020-12-03 DIAGNOSIS — E78.5 DYSLIPIDEMIA: Primary | ICD-10-CM

## 2020-12-03 RX ORDER — ATORVASTATIN CALCIUM 10 MG/1
10 TABLET, FILM COATED ORAL AT BEDTIME
Qty: 90 TABLET | Refills: 3 | Status: SHIPPED | OUTPATIENT
Start: 2020-12-03 | End: 2022-01-31

## 2020-12-22 ENCOUNTER — ANCILLARY PROCEDURE (OUTPATIENT)
Dept: MRI IMAGING | Facility: CLINIC | Age: 55
End: 2020-12-22
Attending: FAMILY MEDICINE
Payer: COMMERCIAL

## 2020-12-22 DIAGNOSIS — M25.561 CHRONIC PAIN OF RIGHT KNEE: ICD-10-CM

## 2020-12-22 DIAGNOSIS — G89.29 CHRONIC PAIN OF RIGHT KNEE: ICD-10-CM

## 2020-12-22 PROCEDURE — 73721 MRI JNT OF LWR EXTRE W/O DYE: CPT | Mod: RT | Performed by: RADIOLOGY

## 2020-12-23 ENCOUNTER — TELEPHONE (OUTPATIENT)
Dept: FAMILY MEDICINE | Facility: CLINIC | Age: 55
End: 2020-12-23

## 2020-12-23 DIAGNOSIS — S83.241D ACUTE MEDIAL MENISCUS TEAR OF RIGHT KNEE, SUBSEQUENT ENCOUNTER: Primary | ICD-10-CM

## 2020-12-23 DIAGNOSIS — G89.29 CHRONIC PAIN OF RIGHT KNEE: ICD-10-CM

## 2020-12-23 DIAGNOSIS — M25.561 CHRONIC PAIN OF RIGHT KNEE: ICD-10-CM

## 2020-12-23 NOTE — TELEPHONE ENCOUNTER
Spoke with patient.  Patient given MRI results and referral information    Antoine Garcia, RN, BSN, PHN  [facility]

## 2020-12-23 NOTE — TELEPHONE ENCOUNTER
VM message left for patient at [phone] requesting call back to clinic at [phone] regarding results.    Antoine Garcia, RN, BSN, PHN  [facility]

## 2020-12-23 NOTE — TELEPHONE ENCOUNTER
Tyrell Solano MD  P Ne Team Julienne             Please call pt. And inform him of his MRI result   Medial meniscus tear   Moderate thickness   Large joint effusion     I have referred him to see Orthopedics   thanks

## 2020-12-28 NOTE — TELEPHONE ENCOUNTER
DIAGNOSIS: Acute medial meniscus tear right knee   APPOINTMENT DATE: 12.30.20   NOTES STATUS DETAILS   OFFICE NOTE from referring provider Internal 12.23.20 [facility]   12.2.20   OFFICE NOTE from other specialist Internal 1.15.19 JENNA Sylvester [facility]  12.8.14 JENNA Benites [facility]   DISCHARGE SUMMARY from hospital N/A    DISCHARGE REPORT from the ER N/A    OPERATIVE REPORT N/A    EMG report N/A    MEDICATION LIST Internal    MRI Internal 12.22.20 R knee   DEXA (osteoporosis/bone health) N/A    CT SCAN N/A    XRAYS (IMAGES & REPORTS) Internal 12.8.14

## 2020-12-29 DIAGNOSIS — M25.561 RIGHT KNEE PAIN, UNSPECIFIED CHRONICITY: Primary | ICD-10-CM

## 2020-12-30 ENCOUNTER — PRE VISIT (OUTPATIENT)
Dept: ORTHOPEDICS | Facility: CLINIC | Age: 55
End: 2020-12-30

## 2020-12-30 ENCOUNTER — OFFICE VISIT (OUTPATIENT)
Dept: ORTHOPEDICS | Facility: CLINIC | Age: 55
End: 2020-12-30
Attending: FAMILY MEDICINE
Payer: COMMERCIAL

## 2020-12-30 ENCOUNTER — ANCILLARY PROCEDURE (OUTPATIENT)
Dept: GENERAL RADIOLOGY | Facility: CLINIC | Age: 55
End: 2020-12-30
Attending: ORTHOPAEDIC SURGERY
Payer: COMMERCIAL

## 2020-12-30 VITALS — WEIGHT: 190 LBS | HEIGHT: 63 IN | BODY MASS INDEX: 33.66 KG/M2

## 2020-12-30 DIAGNOSIS — M25.561 CHRONIC PAIN OF RIGHT KNEE: ICD-10-CM

## 2020-12-30 DIAGNOSIS — G89.29 CHRONIC PAIN OF RIGHT KNEE: ICD-10-CM

## 2020-12-30 DIAGNOSIS — S83.241D ACUTE MEDIAL MENISCUS TEAR OF RIGHT KNEE, SUBSEQUENT ENCOUNTER: ICD-10-CM

## 2020-12-30 DIAGNOSIS — M25.561 RIGHT KNEE PAIN, UNSPECIFIED CHRONICITY: ICD-10-CM

## 2020-12-30 PROCEDURE — 73562 X-RAY EXAM OF KNEE 3: CPT | Mod: RT | Performed by: RADIOLOGY

## 2020-12-30 PROCEDURE — 99203 OFFICE O/P NEW LOW 30 MIN: CPT | Performed by: STUDENT IN AN ORGANIZED HEALTH CARE EDUCATION/TRAINING PROGRAM

## 2020-12-30 ASSESSMENT — MIFFLIN-ST. JEOR: SCORE: 1581.96

## 2020-12-30 NOTE — LETTER
12/30/2020         RE: Tim Her  [address] [facility] [address]        Dear Colleague,    Thank you for referring your patient, Tim Her, to the [facility] [place]. Please see a copy of my visit note below.    Patient seen and examined with the resident.     Assesment: Medial compartment osteoarthritis right knee    Plan: Long discussion with the patient regarding his right knee.  Reviewed the diagnosis potential treatment options.  He is too much arthritis to warrant an arthroscopic procedure.  If he fails nonsurgical management and the treatment for him will be arthroplasty.    At this time he is not enough bothered by this and he is satisfied with just having some answers.  He will reach out to my office if he would like a referral to a joint replacement surgeon.    He has a standing offer for corticosteroid injection management or oral anti-inflammatories if you would like.    I agree with history, physical and imaging as well as the assessment and plan as detailed by Dr. Ren.       CHIEF CONCERN:   Chief Complaint   Patient presents with     Consult     Right knee        HISTORY:   Tim Her is a 55 year old male who presents to clinic today for evaluation of right knee pain. Pt states that he has had right knee pain waxing and waning for at least 6 years.  He denies any mechanical symptoms.  Reports his pain is mostly medial.  He occasionally takes ibuprofen but likes to avoid any medications if he can.  He had a recent MRI which revealed a medial meniscus tear so was referred for surgical consultation.  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  Is not had any therapy or injections for his knee.  No previous injuries or surgeries.      PAST MEDICAL HISTORY: (Reviewed with the patient and in the [facility] medical record)  Past Medical History:   Diagnosis Date     DJD (degenerative joint  disease)     BACK     High cholesterol      HTN (hypertension)      Hypertension goal BP (blood pressure) < 140/90 9/2/2011     Patient overweight      Seizures (H)        PAST SURGICAL HISTORY: (Reviewed with the patient and in the [facility] medical record)  No past surgical history on file.    MEDICATIONS: (Reviewed with the patient and in the [facility] medical record)  Notable medications include:  Current Outpatient Medications   Medication Sig Dispense Refill     amLODIPine (NORVASC) 10 MG tablet One tab daily 90 tablet 3     aspirin (ASA) 81 MG tablet Take 1 tablet (81 mg) by mouth daily 90 tablet 3     atorvastatin (LIPITOR) 10 MG tablet Take 1 tablet (10 mg) by mouth At Bedtime 90 tablet 3     EPINEPHrine (ANY BX GENERIC EQUIV) 0.3 MG/0.3ML injection 2-pack Inject 0.3 mLs (0.3 mg) into the muscle once as needed for anaphylaxis 0.6 mL 1     hydrochlorothiazide (HYDRODIURIL) 25 MG tablet Take 2 tablets (50 mg) by mouth daily 180 tablet 3     KRILL OIL PO Take by mouth daily       metoprolol succinate ER (TOPROL-XL) 25 MG 24 hr tablet Take 1 tablet (25 mg) by mouth daily 90 tablet 3     MULTI VITAMIN MENS PO TABS 1 TABLETby mouth every morning       terbinafine (LAMISIL) 1 % external cream Apply to area bid for 14 days 9 please give 2 tubes ) (Patient not taking: Reported on 12/2/2020) 84 g 0        ALLERGIES: (Reviewed with the patient and in the EPIC medical record)  Allergies   Allergen Reactions     Flurbiprofen Itching     Lisinopril      angioedema       SOCIAL HISTORY: (Reviewed with the patient and in the medical record)  --Tobacco: No  --Occupation: Currently works a desk job for [employer]  --Avocation/Sport: Walking and bike riding    FAMILY HISTORY: (Reviewed with the patient and in the medical record)  -- No family history of bleeding, clotting, or difficulty with anesthesia    REVIEW OF SYSTEMS: (Reviewed with the patient and on the health intake form)  -- A comprehensive 10 point  "review of systems was conducted and is negative except as noted in the HPI    EXAM:   General: Awake, Alert and Oriented, No acute Distress. Articulate and Interactive  Ht 1.6 m (5' 3\")   Wt 86.2 kg (190 lb)   BMI 33.66 kg/m       Right lower extremity and knee exam:    Skin is Warm and Well perfused, no suggestion of infection, no previous incisions    Pain over medial joint line and even worse over proximal medial tibial plateau.  Minimal lateral joint line pain.  Crepitus in the patellofemoral joint with compression.  Stable Lachman.  Stable to varus and valgus.    EHL/FHL/TA/GS 5/5    Sensation intact L3-S1    Brisk distal capillary refill     IMAGING:  Plain Radiographs: AP, lateral, and sunrise views of right knee reviewed and demonstrate severe osteoarthritis of the medial compartment and mild to moderate arthritis in the patellofemoral compartment    MRI: Right knee MRI demonstrates grade 4 chondrosis of the medial compartment with complex meniscus tear.    ASSESSMENT:  1. 55 year old male with right severe medial compartment osteoarthritis    PLAN:  1. Discussion was had how the meniscus tear is related to the severe arthritis and then arthroscopic partial meniscectomy would not be beneficial for his pain.  He he understood this and was agreement that surgery should not be performed for this.  2. A referral to a knee arthroplasty surgeon was discussed.  He is not interested in that at this time because he feels like his symptoms are not bad enough however understands that that referral can be placed at any point that he would like.  3. Prescription strength anti-inflammatories were offered and patient declined them because he does not like taking medications and not feel as severe enough to warrant this.  4. Steroid injection was also offered however he also declined this and feeling that his knee pain is not severe enough at this time to need this.  He understood that both anti-inflammatory medications " or steroid injections could still be performed in the future and he can call the clinic if he is interested in any of those.    Christian Ren MD  Fellow, Sports Medicine & Shoulder  [facility]

## 2020-12-30 NOTE — PROGRESS NOTES
CHIEF CONCERN:   Chief Complaint   Patient presents with     Consult     Right knee        HISTORY:   Tim Her is a 55 year old male who presents to clinic today for evaluation of right knee pain. Pt states that he has had right knee pain waxing and waning for at least 6 years.  He denies any mechanical symptoms.  Reports his pain is mostly medial.  He occasionally takes ibuprofen but likes to avoid any medications if he can.  He had a recent MRI which revealed a medial meniscus tear so was referred for surgical consultation.  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  Is not had any therapy or injections for his knee.  No previous injuries or surgeries.      PAST MEDICAL HISTORY: (Reviewed with the patient and in the EPIC medical record)  Past Medical History:   Diagnosis Date     DJD (degenerative joint disease)     BACK     High cholesterol      HTN (hypertension)      Hypertension goal BP (blood pressure) < 140/90 9/2/2011     Patient overweight      Seizures (H)        PAST SURGICAL HISTORY: (Reviewed with the patient and in the EPIC medical record)  No past surgical history on file.    MEDICATIONS: (Reviewed with the patient and in the EPIC medical record)  Notable medications include:  Current Outpatient Medications   Medication Sig Dispense Refill     amLODIPine (NORVASC) 10 MG tablet One tab daily 90 tablet 3     aspirin (ASA) 81 MG tablet Take 1 tablet (81 mg) by mouth daily 90 tablet 3     atorvastatin (LIPITOR) 10 MG tablet Take 1 tablet (10 mg) by mouth At Bedtime 90 tablet 3     EPINEPHrine (ANY BX GENERIC EQUIV) 0.3 MG/0.3ML injection 2-pack Inject 0.3 mLs (0.3 mg) into the muscle once as needed for anaphylaxis 0.6 mL 1     hydrochlorothiazide (HYDRODIURIL) 25 MG tablet Take 2 tablets (50 mg) by mouth daily 180 tablet 3     KRILL OIL PO Take by mouth daily       metoprolol succinate ER (TOPROL-XL) 25 MG 24 hr tablet Take 1 tablet (25 mg) by  "mouth daily 90 tablet 3     MULTI VITAMIN MENS PO TABS 1 TABLETby mouth every morning       terbinafine (LAMISIL) 1 % external cream Apply to area bid for 14 days 9 please give 2 tubes ) (Patient not taking: Reported on 12/2/2020) 84 g 0        ALLERGIES: (Reviewed with the patient and in the EPIC medical record)  Allergies   Allergen Reactions     Flurbiprofen Itching     Lisinopril      angioedema       SOCIAL HISTORY: (Reviewed with the patient and in the medical record)  --Tobacco: No  --Occupation: Currently works a desk job for the [employer]  --Avocation/Sport: Walking and bike riding    FAMILY HISTORY: (Reviewed with the patient and in the medical record)  -- No family history of bleeding, clotting, or difficulty with anesthesia    REVIEW OF SYSTEMS: (Reviewed with the patient and on the health intake form)  -- A comprehensive 10 point review of systems was conducted and is negative except as noted in the HPI    EXAM:   General: Awake, Alert and Oriented, No acute Distress. Articulate and Interactive  Ht 1.6 m (5' 3\")   Wt 86.2 kg (190 lb)   BMI 33.66 kg/m       Right lower extremity and knee exam:    Skin is Warm and Well perfused, no suggestion of infection, no previous incisions    Pain over medial joint line and even worse over proximal medial tibial plateau.  Minimal lateral joint line pain.  Crepitus in the patellofemoral joint with compression.  Stable Lachman.  Stable to varus and valgus.    EHL/FHL/TA/GS 5/5    Sensation intact L3-S1    Brisk distal capillary refill     IMAGING:  Plain Radiographs: AP, lateral, and sunrise views of right knee reviewed and demonstrate severe osteoarthritis of the medial compartment and mild to moderate arthritis in the patellofemoral compartment    MRI: Right knee MRI demonstrates grade 4 chondrosis of the medial compartment with complex meniscus tear.    ASSESSMENT:  1. 55 year old male with right severe medial compartment " osteoarthritis    PLAN:  1. Discussion was had how the meniscus tear is related to the severe arthritis and then arthroscopic partial meniscectomy would not be beneficial for his pain.  He he understood this and was agreement that surgery should not be performed for this.  2. A referral to a knee arthroplasty surgeon was discussed.  He is not interested in that at this time because he feels like his symptoms are not bad enough however understands that that referral can be placed at any point that he would like.  3. Prescription strength anti-inflammatories were offered and patient declined them because he does not like taking medications and not feel as severe enough to warrant this.  4. Steroid injection was also offered however he also declined this and feeling that his knee pain is not severe enough at this time to need this.  He understood that both anti-inflammatory medications or steroid injections could still be performed in the future and he can call the clinic if he is interested in any of those.    Christian Ren MD  Fellow, Sports Medicine & Shoulder  [facility]

## 2020-12-30 NOTE — PROGRESS NOTES
Patient seen and examined with the resident.     Assesment: Medial compartment osteoarthritis right knee    Plan: Long discussion with the patient regarding his right knee.  Reviewed the diagnosis potential treatment options.  He is too much arthritis to warrant an arthroscopic procedure.  If he fails nonsurgical management and the treatment for him will be arthroplasty.    At this time he is not enough bothered by this and he is satisfied with just having some answers.  He will reach out to my office if he would like a referral to a joint replacement surgeon.    He has a standing offer for corticosteroid injection management or oral anti-inflammatories if you would like.    I agree with history, physical and imaging as well as the assessment and plan as detailed by Dr. Ren.

## 2021-03-01 ENCOUNTER — TELEPHONE (OUTPATIENT)
Dept: FAMILY MEDICINE | Facility: CLINIC | Age: 56
End: 2021-03-01

## 2021-03-01 DIAGNOSIS — Z12.11 COLON CANCER SCREENING: Primary | ICD-10-CM

## 2021-03-01 NOTE — TELEPHONE ENCOUNTER
Patient has been having an annual  FIT test since his abnormal prostate test x several years now. Can an order be placed ? Patient will go to the [facility] to  when ready.

## 2021-03-02 NOTE — TELEPHONE ENCOUNTER
Spoke to patient over the phone. Lab only appointment scheduled for 3/9/2021.  Ann-Marie Saldana CMA ([facility])

## 2021-03-26 DIAGNOSIS — Z12.11 COLON CANCER SCREENING: ICD-10-CM

## 2021-03-30 LAB — HEMOCCULT STL QL IA: NEGATIVE

## 2021-03-30 PROCEDURE — 82274 ASSAY TEST FOR BLOOD FECAL: CPT | Performed by: FAMILY MEDICINE

## 2022-01-28 ENCOUNTER — OFFICE VISIT (OUTPATIENT)
Dept: FAMILY MEDICINE | Facility: CLINIC | Age: 57
End: 2022-01-28
Payer: COMMERCIAL

## 2022-01-28 VITALS
HEIGHT: 63 IN | DIASTOLIC BLOOD PRESSURE: 82 MMHG | OXYGEN SATURATION: 98 % | SYSTOLIC BLOOD PRESSURE: 138 MMHG | TEMPERATURE: 96.9 F | WEIGHT: 201.6 LBS | HEART RATE: 69 BPM | BODY MASS INDEX: 35.72 KG/M2 | RESPIRATION RATE: 18 BRPM

## 2022-01-28 DIAGNOSIS — Z12.11 COLON CANCER SCREENING: ICD-10-CM

## 2022-01-28 DIAGNOSIS — E78.5 DYSLIPIDEMIA: ICD-10-CM

## 2022-01-28 DIAGNOSIS — Z12.5 SCREENING FOR PROSTATE CANCER: ICD-10-CM

## 2022-01-28 DIAGNOSIS — M17.11 PRIMARY OSTEOARTHRITIS OF RIGHT KNEE: ICD-10-CM

## 2022-01-28 DIAGNOSIS — Z00.00 ROUTINE GENERAL MEDICAL EXAMINATION AT A HEALTH CARE FACILITY: Primary | ICD-10-CM

## 2022-01-28 DIAGNOSIS — E66.01 MORBID OBESITY (H): ICD-10-CM

## 2022-01-28 DIAGNOSIS — Z28.21 2019-NCOV VACCINATION DECLINED: ICD-10-CM

## 2022-01-28 DIAGNOSIS — I10 ESSENTIAL HYPERTENSION WITH GOAL BLOOD PRESSURE LESS THAN 140/90: ICD-10-CM

## 2022-01-28 LAB
ALBUMIN SERPL-MCNC: 4.6 G/DL (ref 3.4–5)
ALP SERPL-CCNC: 72 U/L (ref 40–150)
ALT SERPL W P-5'-P-CCNC: 35 U/L (ref 0–70)
ANION GAP SERPL CALCULATED.3IONS-SCNC: 5 MMOL/L (ref 3–14)
AST SERPL W P-5'-P-CCNC: 24 U/L (ref 0–45)
BILIRUB SERPL-MCNC: 0.6 MG/DL (ref 0.2–1.3)
BUN SERPL-MCNC: 10 MG/DL (ref 7–30)
CALCIUM SERPL-MCNC: 9.8 MG/DL (ref 8.5–10.1)
CHLORIDE BLD-SCNC: 106 MMOL/L (ref 94–109)
CHOLEST SERPL-MCNC: 218 MG/DL
CO2 SERPL-SCNC: 28 MMOL/L (ref 20–32)
CREAT SERPL-MCNC: 0.81 MG/DL (ref 0.66–1.25)
FASTING STATUS PATIENT QL REPORTED: ABNORMAL
GFR SERPL CREATININE-BSD FRML MDRD: >90 ML/MIN/1.73M2
GLUCOSE BLD-MCNC: 101 MG/DL (ref 70–99)
HDLC SERPL-MCNC: 54 MG/DL
LDLC SERPL CALC-MCNC: 132 MG/DL
NONHDLC SERPL-MCNC: 164 MG/DL
POTASSIUM BLD-SCNC: 3.2 MMOL/L (ref 3.4–5.3)
PROT SERPL-MCNC: 8.4 G/DL (ref 6.8–8.8)
PSA SERPL-MCNC: 1.33 UG/L (ref 0–4)
SODIUM SERPL-SCNC: 139 MMOL/L (ref 133–144)
TRIGL SERPL-MCNC: 160 MG/DL

## 2022-01-28 PROCEDURE — 80053 COMPREHEN METABOLIC PANEL: CPT | Performed by: FAMILY MEDICINE

## 2022-01-28 PROCEDURE — G0103 PSA SCREENING: HCPCS | Performed by: FAMILY MEDICINE

## 2022-01-28 PROCEDURE — 82274 ASSAY TEST FOR BLOOD FECAL: CPT | Performed by: FAMILY MEDICINE

## 2022-01-28 PROCEDURE — 36415 COLL VENOUS BLD VENIPUNCTURE: CPT | Performed by: FAMILY MEDICINE

## 2022-01-28 PROCEDURE — 99396 PREV VISIT EST AGE 40-64: CPT | Performed by: FAMILY MEDICINE

## 2022-01-28 PROCEDURE — 80061 LIPID PANEL: CPT | Performed by: FAMILY MEDICINE

## 2022-01-28 RX ORDER — METOPROLOL SUCCINATE 25 MG/1
25 TABLET, EXTENDED RELEASE ORAL DAILY
Qty: 90 TABLET | Refills: 3 | Status: SHIPPED | OUTPATIENT
Start: 2022-01-28 | End: 2023-01-24

## 2022-01-28 RX ORDER — ASPIRIN 81 MG/1
81 TABLET, CHEWABLE ORAL DAILY
Qty: 100 TABLET | Refills: 3 | Status: SHIPPED | OUTPATIENT
Start: 2022-01-28 | End: 2023-04-27

## 2022-01-28 RX ORDER — HYDROCHLOROTHIAZIDE 25 MG/1
50 TABLET ORAL DAILY
Qty: 180 TABLET | Refills: 3 | Status: SHIPPED | OUTPATIENT
Start: 2022-01-28 | End: 2022-01-31

## 2022-01-28 RX ORDER — EPINEPHRINE 0.3 MG/.3ML
0.3 INJECTION SUBCUTANEOUS
Qty: 0.6 ML | Refills: 1 | Status: SHIPPED | OUTPATIENT
Start: 2022-01-28 | End: 2023-08-03

## 2022-01-28 RX ORDER — AMLODIPINE BESYLATE 10 MG/1
TABLET ORAL
Qty: 90 TABLET | Refills: 3 | Status: SHIPPED | OUTPATIENT
Start: 2022-01-28 | End: 2023-01-24

## 2022-01-28 ASSESSMENT — ENCOUNTER SYMPTOMS
SORE THROAT: 0
PALPITATIONS: 0
FEVER: 0
NERVOUS/ANXIOUS: 0
PARESTHESIAS: 0
ENDOCRINE NEGATIVE: 1
SHORTNESS OF BREATH: 0
HEADACHES: 0
ALLERGIC/IMMUNOLOGIC NEGATIVE: 1
HEMATOCHEZIA: 0
DIARRHEA: 0
WEAKNESS: 0
CHILLS: 0
DYSURIA: 0
JOINT SWELLING: 1
FREQUENCY: 0
CONSTIPATION: 0
COUGH: 0
MYALGIAS: 1
HEMATOLOGIC/LYMPHATIC NEGATIVE: 1
ABDOMINAL PAIN: 0
HEMATURIA: 0
NAUSEA: 0
ARTHRALGIAS: 1
HEARTBURN: 0
EYE PAIN: 0
DIZZINESS: 0

## 2022-01-28 ASSESSMENT — PAIN SCALES - GENERAL: PAINLEVEL: NO PAIN (0)

## 2022-01-28 ASSESSMENT — MIFFLIN-ST. JEOR: SCORE: 1629.58

## 2022-01-28 NOTE — PROGRESS NOTES
SUBJECTIVE:   CC: Tim Her is an 56 year old male who presents for preventative health visit.     Comes for an annual exam, history of hypertension, morbid obesity.  Patient has not been vaccinated for COVID, has been declining.  Chronic knee pain, osteoarthritis.  He would like to get a handicap parking sticker.  He reports difficulty for him to walk long distance especially in the winter season    Patient has been advised of split billing requirements and indicates understanding: Yes  Healthy Habits:     Getting at least 3 servings of Calcium per day:  Yes    Bi-annual eye exam:  Yes    Dental care twice a year:  Yes    Sleep apnea or symptoms of sleep apnea:  None    Diet:  Regular (no restrictions)    Frequency of exercise:  6-7 days/week    Duration of exercise:  45-60 minutes    Taking medications regularly:  Yes    Barriers to taking medications:  None    Medication side effects:  Other    PHQ-2 Total Score: 0    Additional concerns today:  Yes      Today's PHQ-2 Score:   PHQ-2 ( 1999 Pfizer) 1/28/2022   Q1: Little interest or pleasure in doing things 0   Q2: Feeling down, depressed or hopeless 0   PHQ-2 Score 0   PHQ-2 Total Score (12-17 Years)- Positive if 3 or more points; Administer PHQ-A if positive -   Q1: Little interest or pleasure in doing things Not at all   Q2: Feeling down, depressed or hopeless Not at all   PHQ-2 Score 0       Abuse: Current or Past(Physical, Sexual or Emotional)- No  Do you feel safe in your environment? Yes        Social History     Tobacco Use     Smoking status: Never Smoker     Smokeless tobacco: Never Used   Substance Use Topics     Alcohol use: Yes     Comment: socially     If you drink alcohol do you typically have >3 drinks per day or >7 drinks per week? Not applicable    Alcohol Use 1/28/2022   Prescreen: >3 drinks/day or >7 drinks/week? No   Prescreen: >3 drinks/day or >7 drinks/week? -   No flowsheet data found.    Last PSA:   PSA   Date Value Ref Range Status    02/07/2020 1.04 0 - 4 ug/L Final     Comment:     Assay Method:  Chemiluminescence using Siemens Vista analyzer       Reviewed orders with patient. Reviewed health maintenance and updated orders accordingly - Yes  Labs reviewed in EPIC  BP Readings from Last 3 Encounters:   01/28/22 138/82   12/02/20 137/85   02/07/20 137/82    Wt Readings from Last 3 Encounters:   01/28/22 91.4 kg (201 lb 9.6 oz)   12/30/20 86.2 kg (190 lb)   12/02/20 87.3 kg (192 lb 8 oz)                  Patient Active Problem List   Diagnosis     Hypertension goal BP (blood pressure) < 140/90     Hyperlipidemia with target LDL less than 130     Elevated prostate specific antigen (PSA)     Obesity     DDD (degenerative disc disease), cervical     Morbid obesity (H)     No past surgical history on file.    Social History     Tobacco Use     Smoking status: Never Smoker     Smokeless tobacco: Never Used   Substance Use Topics     Alcohol use: Yes     Comment: socially     Family History   Problem Relation Age of Onset     C.A.D. Father 50        mi, cabg     Hypertension Father      Diabetes No family hx of            Reviewed and updated as needed this visit by clinical staff  Tobacco   Meds             Reviewed and updated as needed this visit by Provider               Past Medical History:   Diagnosis Date     DJD (degenerative joint disease)     BACK     High cholesterol      HTN (hypertension)      Hypertension goal BP (blood pressure) < 140/90 9/2/2011     Patient overweight      Seizures (H)       No past surgical history on file.  OB History   No obstetric history on file.       Review of Systems   Constitutional: Negative for chills and fever.   HENT: Negative for congestion, ear pain, hearing loss and sore throat.    Eyes: Negative for pain and visual disturbance.   Respiratory: Negative for cough and shortness of breath.    Cardiovascular: Negative for chest pain, palpitations and peripheral edema.   Gastrointestinal: Negative for  "abdominal pain, constipation, diarrhea, heartburn, hematochezia and nausea.   Endocrine: Negative.    Genitourinary: Negative for dysuria, frequency, genital sores, hematuria, impotence, penile discharge and urgency.   Musculoskeletal: Positive for arthralgias, joint swelling and myalgias.   Skin: Negative for rash.   Allergic/Immunologic: Negative.    Neurological: Negative for dizziness, weakness, headaches and paresthesias.   Hematological: Negative.    Psychiatric/Behavioral: Negative for mood changes. The patient is not nervous/anxious.      CONSTITUTIONAL: NEGATIVE for fever, chills, change in weight  INTEGUMENTARY/SKIN: NEGATIVE for worrisome rashes, moles or lesions  EYES: NEGATIVE for vision changes or irritation  ENT: NEGATIVE for ear, mouth and throat problems  RESP: NEGATIVE for significant cough or SOB  CV: NEGATIVE for chest pain, palpitations or peripheral edema  GI: NEGATIVE for nausea, abdominal pain, heartburn, or change in bowel habits   male: negative for dysuria, hematuria, decreased urinary stream, erectile dysfunction, urethral discharge  MUSCULOSKELETAL: NEGATIVE for significant arthralgias or myalgia  NEURO: NEGATIVE for weakness, dizziness or paresthesias  ENDOCRINE: NEGATIVE for temperature intolerance, skin/hair changes  HEME/ALLERGY/IMMUNE: NEGATIVE for bleeding problems  PSYCHIATRIC: NEGATIVE for changes in mood or affect    OBJECTIVE:   /82 (BP Location: Right arm, Patient Position: Chair, Cuff Size: Adult Regular)   Pulse 69   Temp 96.9  F (36.1  C) (Tympanic)   Resp 18   Ht 1.6 m (5' 3\")   Wt 91.4 kg (201 lb 9.6 oz)   SpO2 98%   BMI 35.71 kg/m      Physical Exam  GENERAL: healthy, alert and no distress  EYES: Eyes grossly normal to inspection, PERRL and conjunctivae and sclerae normal  HENT: ear canals and TM's normal, nose and mouth without ulcers or lesions  NECK: no adenopathy, no asymmetry, masses, or scars and thyroid normal to palpation  RESP: lungs clear to " auscultation - no rales, rhonchi or wheezes  CV: regular rate and rhythm, normal S1 S2, no S3 or S4, no murmur, click or rub, no peripheral edema and peripheral pulses strong  ABDOMEN: soft, nontender, no hepatosplenomegaly, no masses and bowel sounds normal  MS: no gross musculoskeletal defects noted, no edema  SKIN: no suspicious lesions or rashes  NEURO: Normal strength and tone, mentation intact and speech normal  PSYCH: mentation appears normal, affect normal/bright  LYMPH: no cervical, supraclavicular, axillary,    Diagnostic Test Results:  Labs reviewed in Epic  Orders Placed This Encounter   Procedures     Lipid panel reflex to direct LDL Fasting     Standing Status:   Future     Standing Expiration Date:   1/28/2023     Order Specific Question:   Perform labs while fasting or non-fasting?     Answer:   Fasting     PSA, screen     Standing Status:   Future     Standing Expiration Date:   1/28/2023     Comprehensive metabolic panel (BMP + Alb, Alk Phos, ALT, AST, Total. Bili, TP)     Standing Status:   Future     Standing Expiration Date:   1/28/2023     Fecal colorectal cancer screen (FIT)     Standing Status:   Future     Standing Expiration Date:   1/28/2023       ASSESSMENT/PLAN:   (Z00.00) Routine general medical examination at a health care facility  (primary encounter diagnosis)  Comment: normal exam  Plan: aspirin (ASA) 81 MG chewable tablet, Lipid         panel reflex to direct LDL Fasting,         Comprehensive metabolic panel (BMP + Alb, Alk         Phos, ALT, AST, Total. Bili, TP)          Routine preventive care discussed.  Advised with diet, increase physical activity, reduce portion size.  Avoid late meals.  1 year annual exam follow-up recommended.  Declined all immunizations today.    (I10) Essential hypertension with goal blood pressure less than 140/90  Comment:   Plan: amLODIPine (NORVASC) 10 MG tablet,         hydrochlorothiazide (HYDRODIURIL) 25 MG tablet,        metoprolol succinate ER  "(TOPROL-XL) 25 MG 24 hr        tablet, aspirin (ASA) 81 MG chewable tablet        Stable, continue with current medication.  Advised with diet, exercise, weight loss.  CMP today.    (E78.5) Dyslipidemia  Comment:  Plan: aspirin (ASA) 81 MG chewable tablet        Lipid panel today  Declined to start Statin or other cholesterol lower agent today.    (Z12.5) Screening for prostate cancer  Comment:   Plan: PSA, screen        screening    (E66.01) Morbid obesity (H)  Comment:   Plan: discussed diet and weight loss    (Z12.11) Colon cancer screening  Comment:   Plan: Fecal colorectal cancer screen (FIT)        screening    (Z28.21) 2019-nCoV vaccination declined  Comment:   Plan: declined    (M17.11) Primary osteoarthritis of right knee  Comment:   Plan: advised with daily exercises.  Continue with Tylenol as needed  A handicap form was filled and given to pt.      Patient has been advised of split billing requirements and indicates understanding: Yes    COUNSELING:   Reviewed preventive health counseling, as reflected in patient instructions       Regular exercise       Healthy diet/nutrition       Immunizations    Declined: Influenza and Zoster and COVID, due to Other ( do not take vaccinations )               Colorectal cancer screening       Prostate cancer screening    Estimated body mass index is 35.71 kg/m  as calculated from the following:    Height as of this encounter: 1.6 m (5' 3\").    Weight as of this encounter: 91.4 kg (201 lb 9.6 oz).     Weight management plan: Discussed healthy diet and exercise guidelines    He reports that he has never smoked. He has never used smokeless tobacco.      Counseling Resources:  ATP IV Guidelines  Pooled Cohorts Equation Calculator  FRAX Risk Assessment  ICSI Preventive Guidelines  Dietary Guidelines for Americans, 2010  USDA's MyPlate  ASA Prophylaxis  Lung CA Screening    Tyrell Solano MD  [facility]  "

## 2022-01-28 NOTE — LETTER
March 15, 2022      Tim Her  [address] [facility] [id number]        Dear ,    We are writing to inform you of your Negative Stool test and   Normal Potassium results.        Resulted Orders   Lipid panel reflex to direct LDL Fasting   Result Value Ref Range    Cholesterol 218 (H) <200 mg/dL    Triglycerides 160 (H) <150 mg/dL    Direct Measure HDL 54 >=40 mg/dL    LDL Cholesterol Calculated 132 (H) <=100 mg/dL    Non HDL Cholesterol 164 (H) <130 mg/dL    Patient Fasting > 8hrs? Unknown     Narrative    Cholesterol  Desirable:  <200 mg/dL    Triglycerides  Normal:  Less than 150 mg/dL  Borderline High:  150-199 mg/dL  High:  200-499 mg/dL  Very High:  Greater than or equal to 500 mg/dL    Direct Measure HDL  Female:  Greater than or equal to 50 mg/dL   Male:  Greater than or equal to 40 mg/dL    LDL Cholesterol  Desirable:  <100mg/dL  Above Desirable:  100-129 mg/dL   Borderline High:  130-159 mg/dL   High:  160-189 mg/dL   Very High:  >= 190 mg/dL    Non HDL Cholesterol  Desirable:  130 mg/dL  Above Desirable:  130-159 mg/dL  Borderline High:  160-189 mg/dL  High:  190-219 mg/dL  Very High:  Greater than or equal to 220 mg/dL   PSA, screen   Result Value Ref Range    Prostate Specific Antigen Screen 1.33 0.00 - 4.00 ug/L   Comprehensive metabolic panel (BMP + Alb, Alk Phos, ALT, AST, Total. Bili, TP)   Result Value Ref Range    Sodium 139 133 - 144 mmol/L    Potassium 3.2 (L) 3.4 - 5.3 mmol/L    Chloride 106 94 - 109 mmol/L    Carbon Dioxide (CO2) 28 20 - 32 mmol/L    Anion Gap 5 3 - 14 mmol/L    Urea Nitrogen 10 7 - 30 mg/dL    Creatinine 0.81 0.66 - 1.25 mg/dL    Calcium 9.8 8.5 - 10.1 mg/dL    Glucose 101 (H) 70 - 99 mg/dL    Alkaline Phosphatase 72 40 - 150 U/L    AST 24 0 - 45 U/L    ALT 35 0 - 70 U/L    Protein Total 8.4 6.8 - 8.8 g/dL    Albumin 4.6 3.4 - 5.0 g/dL    Bilirubin Total 0.6 0.2 - 1.3 mg/dL    GFR Estimate >90 >60 mL/min/1.73m2      Comment:      Effective December 21,  2021 eGFRcr in adults is calculated using the 2021 CKD-EPI creatinine equation which includes age and gender (Steph et al., NEJM, DOI: 10.1056/RTWJxf9283680)   Fecal colorectal cancer screen (FIT)   Result Value Ref Range    Occult Blood Screen FIT Negative Negative       If you have any questions or concerns, please call the clinic at the number listed above.       Sincerely,    Tyrell Solano MD/kb

## 2022-01-31 ENCOUNTER — TELEPHONE (OUTPATIENT)
Dept: FAMILY MEDICINE | Facility: CLINIC | Age: 57
End: 2022-01-31
Payer: COMMERCIAL

## 2022-01-31 DIAGNOSIS — I10 ESSENTIAL HYPERTENSION WITH GOAL BLOOD PRESSURE LESS THAN 140/90: ICD-10-CM

## 2022-01-31 DIAGNOSIS — E78.5 DYSLIPIDEMIA: ICD-10-CM

## 2022-01-31 RX ORDER — ATORVASTATIN CALCIUM 10 MG/1
10 TABLET, FILM COATED ORAL AT BEDTIME
Qty: 90 TABLET | Refills: 3 | Status: SHIPPED | OUTPATIENT
Start: 2022-01-31 | End: 2022-02-17

## 2022-01-31 RX ORDER — HYDROCHLOROTHIAZIDE 25 MG/1
25 TABLET ORAL DAILY
Qty: 90 TABLET | Refills: 3 | Status: SHIPPED | OUTPATIENT
Start: 2022-01-31 | End: 2023-01-31

## 2022-01-31 NOTE — TELEPHONE ENCOUNTER
Pt called back. Reviewed results and providers recommendations. Pt verbalized an understanding.  Pt requested results and recommendations also be mailed to him.     Results and recommendations also sent pt via mail.    Rimma Montana RN

## 2022-01-31 NOTE — TELEPHONE ENCOUNTER
"Left message for patient to return call to clinic to discuss below message      Alysia Alexandre RN      ----- Message from Tyrell Solano MD sent at 1/31/2022 12:05 PM CST -----  Please call pt with his results  Normal for PSA, prostate cancer screening.    Patient potassium on the low side, at 3.2.  Normal for kidney function, liver enzyme.  Could related to high dose of hydrochlorothiazide.Advised patient to decrease his hydrochlorothiazide to 1 tablet, 25 mg 1 tablet daily.Continue with metoprolol, amlodipine.Continue monitor blood pressure.Follow-up in the clinic in 2 to 4 weeks.In addition, started cholesterol, LDL on the high side.  Advised patient with diet, exercise, weight loss.I did resend his Lipitor 1 tablet at bedtime.  If he is willing to restart his cholesterol medication.  The 10-year ASCVD risk score (Samantaadrián VALENCIA Jr., et al., 2013) is: 9.9%    Values used to calculate the score:      Age: 56 years      Sex: Male      Is Non- : No      Diabetic: No      Tobacco smoker: No      Systolic Blood Pressure: 138 mmHg      Is BP treated: Yes      HDL Cholesterol: 54 mg/dL      Total Cholesterol: 218 mg/dL  Please follow-up in 2 to 4 weeks to recheck your blood pressure, complete potassium.    Ways to improve your cholesterol...     1- Eats less saturated fats (including avoiding \"trans\" fats), fatty foods.  Eat more baked food, than fried food.     2 - Eat more unsaturated fats  - found in vegetables, grains, and tree nuts.  Also by replacing butter with canola oil or olive oil.     3 - Eat more nuts.  1-2 ounces (a small handful) of almonds, walnuts, hazelnuts or pecans once a day in place of other less healthy snacks.     4 - Eat more high fiber foods - vegetables and whole grains including oat bran, oats, beans, peas, and flax seed.     5 - Eat more fish - such as salmon, tuna, mackerel, and sardines.  1 or 2 six ounce servings per week is a healthy replacement for other " proteins.     6 - Exercise for at least 120 minutes per week - which is equal to 30 minutes 4 days per week.    7- avoid late meals, at least  a few hours before bedtime.

## 2022-01-31 NOTE — LETTER
"Tim ESSIE Taina  [address] [place] [id number]    1/31/2022        Dear Tim    I am writing you in regards to your recent lab results obtained at the clinic.    Normal  PSA (prostate cancer screening)    Potassium level on the low side, at 3.2. This could be related to high dose of hydrochlorothiazide. Advised you decrease your hydrochlorothiazide to 1 tablet, 25 mg a day.Continue with metoprolol and amlodipine. Continue monitor blood pressure.  Normal kidney liver function  In addition, cholesterol - LDL (bad cholesterol )on the high side.  Advised to work on diet, exercise and  weight loss. Recommend you start Atorvastatin (Lipitor) 10mg  1 tablet at bedtime.      **Please follow-up in 2 to 4 weeks to recheck your blood pressure and recheck potassium blood draw    Ways to improve your cholesterol...     1- Eats less saturated fats (including avoiding \"trans\" fats), fatty foods.  Eat more baked food, than fried food.     2 - Eat more unsaturated fats  - found in vegetables, grains, and tree nuts.  Also by replacing butter with canola oil or olive oil.     3 - Eat more nuts.  1-2 ounces (a small handful) of almonds, walnuts, hazelnuts or pecans once a day in place of other less healthy snacks.     4 - Eat more high fiber foods - vegetables and whole grains including oat bran, oats, beans, peas, and flax seed.     5 - Eat more fish - such as salmon, tuna, mackerel, and sardines.  1 or 2 six ounce servings per week is a healthy replacement for other proteins.     6 - Exercise for at least 120 minutes per week - which is equal to 30 minutes 4 days per week.    7- avoid late meals, at least  a few hours before bedtime.      Component      Latest Ref Rng & Units 1/28/2022   Sodium      133 - 144 mmol/L 139   Potassium      3.4 - 5.3 mmol/L 3.2 (L)   Chloride      94 - 109 mmol/L 106   Carbon Dioxide      20 - 32 mmol/L 28   Anion Gap      3 - 14 mmol/L 5   Urea Nitrogen      7 - 30 mg/dL 10   Creatinine      " 0.66 - 1.25 mg/dL 0.81   Calcium      8.5 - 10.1 mg/dL 9.8   Glucose      70 - 99 mg/dL 101 (H)   Alkaline Phosphatase      40 - 150 U/L 72   AST      0 - 45 U/L 24   ALT      0 - 70 U/L 35   Protein Total      6.8 - 8.8 g/dL 8.4   Albumin      3.4 - 5.0 g/dL 4.6   Bilirubin Total      0.2 - 1.3 mg/dL 0.6   GFR Estimate      >60 mL/min/1.73m2 >90   Cholesterol      <200 mg/dL 218 (H)   Triglycerides      <150 mg/dL 160 (H)   HDL Cholesterol      >=40 mg/dL 54   LDL Cholesterol Calculated      <=100 mg/dL 132 (H)   Non HDL Cholesterol      <130 mg/dL 164 (H)   Patient Fasting > 8hrs?       Unknown   PSA      0.00 - 4.00 ug/L 1.33       Sincerely,    Dr. Russ WEST [address] [address] [phone]

## 2022-02-17 ENCOUNTER — OFFICE VISIT (OUTPATIENT)
Dept: FAMILY MEDICINE | Facility: CLINIC | Age: 57
End: 2022-02-17
Payer: COMMERCIAL

## 2022-02-17 VITALS
BODY MASS INDEX: 36.24 KG/M2 | OXYGEN SATURATION: 96 % | SYSTOLIC BLOOD PRESSURE: 122 MMHG | TEMPERATURE: 97.5 F | HEART RATE: 66 BPM | RESPIRATION RATE: 16 BRPM | DIASTOLIC BLOOD PRESSURE: 72 MMHG | WEIGHT: 204.6 LBS

## 2022-02-17 DIAGNOSIS — E87.6 HYPOKALEMIA: ICD-10-CM

## 2022-02-17 DIAGNOSIS — E78.5 DYSLIPIDEMIA: ICD-10-CM

## 2022-02-17 DIAGNOSIS — I10 ESSENTIAL HYPERTENSION WITH GOAL BLOOD PRESSURE LESS THAN 140/90: Primary | ICD-10-CM

## 2022-02-17 PROCEDURE — 80048 BASIC METABOLIC PNL TOTAL CA: CPT | Performed by: FAMILY MEDICINE

## 2022-02-17 PROCEDURE — 99214 OFFICE O/P EST MOD 30 MIN: CPT | Performed by: FAMILY MEDICINE

## 2022-02-17 PROCEDURE — 36415 COLL VENOUS BLD VENIPUNCTURE: CPT | Performed by: FAMILY MEDICINE

## 2022-02-17 RX ORDER — ATORVASTATIN CALCIUM 10 MG/1
10 TABLET, FILM COATED ORAL AT BEDTIME
Qty: 90 TABLET | Refills: 3 | Status: SHIPPED | OUTPATIENT
Start: 2022-02-17 | End: 2023-02-01

## 2022-02-17 ASSESSMENT — PAIN SCALES - GENERAL: PAINLEVEL: NO PAIN (0)

## 2022-02-17 NOTE — PROGRESS NOTES
Assessment & Plan     Essential hypertension with goal blood pressure less than 140/90  Stable, continue with current medications  - Basic metabolic panel  (Ca, Cl, CO2, Creat, Gluc, K, Na, BUN)    Dyslipidemia  Pt. Declined to start Lipitor  Discussed diet and weight loss  Increase physical activities.  - atorvastatin (LIPITOR) 10 MG tablet; Take 1 tablet (10 mg) by mouth At Bedtime Not taken    Hypokalemia  Repeat patient metabolic panel today.  Discussed with patient if his potassium is still low then will need to switch his hydrochlorothiazide      Return in about 11 months (around 1/17/2023) for Routine preventive, in person.    Tyrell Solano MD  [facility]    Pratima Dumont is a 56 year old who presents for the following health issues   Patient is here to discuss lab results and recheck medications.  Patient with history of hypertension, has been hydrochlorothiazide, amlodipine, metoprolol.  Hypokalemia.  His hydrochlorothiazide was decreased to 25 mg.  Blood pressure has been well controlled.  Dyslipidemia, he has been declined to start Atorvastatin.      Review of Systems   Constitutional, HEENT, cardiovascular, pulmonary, gi and gu systems are negative, except as otherwise noted.      Objective    There were no vitals taken for this visit.  There is no height or weight on file to calculate BMI.  Physical Exam   GENERAL: healthy, alert and no distress  PSYCH: mentation appears normal, affect normal/bright    No orders of the defined types were placed in this encounter.    Tyrell Solano MD.

## 2022-02-18 LAB
ANION GAP SERPL CALCULATED.3IONS-SCNC: 8 MMOL/L (ref 3–14)
BUN SERPL-MCNC: 13 MG/DL (ref 7–30)
CALCIUM SERPL-MCNC: 9.5 MG/DL (ref 8.5–10.1)
CHLORIDE BLD-SCNC: 107 MMOL/L (ref 94–109)
CO2 SERPL-SCNC: 25 MMOL/L (ref 20–32)
CREAT SERPL-MCNC: 0.85 MG/DL (ref 0.66–1.25)
GFR SERPL CREATININE-BSD FRML MDRD: >90 ML/MIN/1.73M2
GLUCOSE BLD-MCNC: 95 MG/DL (ref 70–99)
POTASSIUM BLD-SCNC: 3.5 MMOL/L (ref 3.4–5.3)
SODIUM SERPL-SCNC: 140 MMOL/L (ref 133–144)

## 2022-03-11 LAB — HEMOCCULT STL QL IA: NEGATIVE

## 2023-01-30 ASSESSMENT — ENCOUNTER SYMPTOMS
ARTHRALGIAS: 1
ABDOMINAL PAIN: 0
FEVER: 0
PALPITATIONS: 0
DYSURIA: 0
JOINT SWELLING: 1
CHILLS: 0
NAUSEA: 0
HEADACHES: 0
CONSTIPATION: 0
PARESTHESIAS: 0
COUGH: 0
DIARRHEA: 0
EYE PAIN: 0
FREQUENCY: 0
NERVOUS/ANXIOUS: 0
WEAKNESS: 0
DIZZINESS: 0
SORE THROAT: 0
MYALGIAS: 0
HEMATURIA: 0
SHORTNESS OF BREATH: 0
HEMATOCHEZIA: 0
HEARTBURN: 0

## 2023-01-31 ENCOUNTER — OFFICE VISIT (OUTPATIENT)
Dept: FAMILY MEDICINE | Facility: CLINIC | Age: 58
End: 2023-01-31
Payer: COMMERCIAL

## 2023-01-31 ENCOUNTER — TELEPHONE (OUTPATIENT)
Dept: FAMILY MEDICINE | Facility: CLINIC | Age: 58
End: 2023-01-31

## 2023-01-31 ENCOUNTER — LAB (OUTPATIENT)
Dept: FAMILY MEDICINE | Facility: CLINIC | Age: 58
End: 2023-01-31

## 2023-01-31 VITALS
HEIGHT: 63 IN | HEART RATE: 69 BPM | TEMPERATURE: 97.9 F | BODY MASS INDEX: 34.69 KG/M2 | OXYGEN SATURATION: 98 % | RESPIRATION RATE: 26 BRPM | DIASTOLIC BLOOD PRESSURE: 78 MMHG | SYSTOLIC BLOOD PRESSURE: 130 MMHG | WEIGHT: 195.8 LBS

## 2023-01-31 DIAGNOSIS — I10 HYPERTENSION GOAL BP (BLOOD PRESSURE) < 140/90: ICD-10-CM

## 2023-01-31 DIAGNOSIS — M50.30 DDD (DEGENERATIVE DISC DISEASE), CERVICAL: ICD-10-CM

## 2023-01-31 DIAGNOSIS — Z12.11 SCREEN FOR COLON CANCER: ICD-10-CM

## 2023-01-31 DIAGNOSIS — E66.01 MORBID OBESITY (H): ICD-10-CM

## 2023-01-31 DIAGNOSIS — E78.5 HYPERLIPIDEMIA WITH TARGET LDL LESS THAN 130: ICD-10-CM

## 2023-01-31 DIAGNOSIS — M47.816 OSTEOARTHRITIS OF LUMBAR SPINE, UNSPECIFIED SPINAL OSTEOARTHRITIS COMPLICATION STATUS: ICD-10-CM

## 2023-01-31 DIAGNOSIS — Z12.5 SCREENING FOR PROSTATE CANCER: ICD-10-CM

## 2023-01-31 DIAGNOSIS — Z00.00 ROUTINE GENERAL MEDICAL EXAMINATION AT A HEALTH CARE FACILITY: Primary | ICD-10-CM

## 2023-01-31 LAB
ALBUMIN SERPL BCG-MCNC: 4.8 G/DL (ref 3.5–5.2)
ALP SERPL-CCNC: 86 U/L (ref 40–129)
ALT SERPL W P-5'-P-CCNC: 22 U/L (ref 10–50)
ANION GAP SERPL CALCULATED.3IONS-SCNC: 14 MMOL/L (ref 7–15)
AST SERPL W P-5'-P-CCNC: 24 U/L (ref 10–50)
BILIRUB SERPL-MCNC: 0.4 MG/DL
BUN SERPL-MCNC: 10.5 MG/DL (ref 8–23)
CALCIUM SERPL-MCNC: 9.9 MG/DL (ref 8.6–10)
CHLORIDE SERPL-SCNC: 104 MMOL/L (ref 98–107)
CHOLEST SERPL-MCNC: 241 MG/DL
CREAT SERPL-MCNC: 0.77 MG/DL (ref 0.67–1.17)
DEPRECATED HCO3 PLAS-SCNC: 25 MMOL/L (ref 22–29)
GFR SERPL CREATININE-BSD FRML MDRD: >90 ML/MIN/1.73M2
GLUCOSE SERPL-MCNC: 108 MG/DL (ref 70–99)
HDLC SERPL-MCNC: 45 MG/DL
LDLC SERPL CALC-MCNC: 172 MG/DL
NONHDLC SERPL-MCNC: 196 MG/DL
POTASSIUM SERPL-SCNC: 3.9 MMOL/L (ref 3.4–5.3)
PROT SERPL-MCNC: 7.6 G/DL (ref 6.4–8.3)
PSA SERPL-MCNC: 0.98 NG/ML (ref 0–3.5)
SODIUM SERPL-SCNC: 143 MMOL/L (ref 136–145)
TRIGL SERPL-MCNC: 120 MG/DL

## 2023-01-31 PROCEDURE — 80053 COMPREHEN METABOLIC PANEL: CPT | Performed by: FAMILY MEDICINE

## 2023-01-31 PROCEDURE — G0103 PSA SCREENING: HCPCS | Performed by: FAMILY MEDICINE

## 2023-01-31 PROCEDURE — 99396 PREV VISIT EST AGE 40-64: CPT | Performed by: FAMILY MEDICINE

## 2023-01-31 PROCEDURE — 80061 LIPID PANEL: CPT | Performed by: FAMILY MEDICINE

## 2023-01-31 PROCEDURE — 36415 COLL VENOUS BLD VENIPUNCTURE: CPT | Performed by: FAMILY MEDICINE

## 2023-01-31 RX ORDER — AMLODIPINE BESYLATE 10 MG/1
TABLET ORAL
Qty: 90 TABLET | Refills: 3 | Status: SHIPPED | OUTPATIENT
Start: 2023-01-31 | End: 2024-02-12

## 2023-01-31 RX ORDER — METOPROLOL SUCCINATE 25 MG/1
25 TABLET, EXTENDED RELEASE ORAL DAILY
Qty: 90 TABLET | Refills: 3 | Status: SHIPPED | OUTPATIENT
Start: 2023-01-31 | End: 2024-02-12

## 2023-01-31 ASSESSMENT — ENCOUNTER SYMPTOMS
FREQUENCY: 0
NAUSEA: 0
MYALGIAS: 0
PALPITATIONS: 0
COUGH: 0
HEADACHES: 0
CHILLS: 0
HEMATOCHEZIA: 0
ARTHRALGIAS: 1
PARESTHESIAS: 0
CONSTIPATION: 0
NERVOUS/ANXIOUS: 0
JOINT SWELLING: 1
DIZZINESS: 0
HEARTBURN: 0
HEMATURIA: 0
SHORTNESS OF BREATH: 0
DIARRHEA: 0
ABDOMINAL PAIN: 0
DYSURIA: 0
SORE THROAT: 0
WEAKNESS: 0
EYE PAIN: 0
FEVER: 0

## 2023-01-31 ASSESSMENT — PAIN SCALES - GENERAL: PAINLEVEL: NO PAIN (0)

## 2023-01-31 NOTE — PROGRESS NOTES
SUBJECTIVE:   CC: Tim is an 57 year old who presents for preventative health visit.      Comes for an annual exam.  History of hypertension, hyperlipidemia.    He is not taking his Atorvastatin, he is been working on his diet and weight.    Quite drinking alcohol over one and half year.    He does have chronic low back pain, degenerative disc disease, in the past he was seen and evaluated by an orthopedic.  He was put on light duty work. However, he reports recently he has been released from his job.    Patient has been advised of split billing requirements and indicates understanding: Yes  Healthy Habits:     Getting at least 3 servings of Calcium per day:  Yes    Bi-annual eye exam:  Yes    Dental care twice a year:  Yes    Sleep apnea or symptoms of sleep apnea:  None    Diet:  Low salt    Frequency of exercise:  6-7 days/week    Duration of exercise:  45-60 minutes    Taking medications regularly:  Yes    Medication side effects:  None    PHQ-2 Total Score: 0    Additional concerns today:  Yes      Hypertension Follow-up      Do you check your blood pressure regularly outside of the clinic? Yes , sometimes    Are you following a low salt diet? Yes    Are your blood pressures ever more than 140 on the top number (systolic) OR more   than 90 on the bottom number (diastolic), for example 140/90? Yes      Today's PHQ-2 Score:   PHQ-2 ( 1999 Pf[employer]) 1/30/2023   Q1: Little interest or pleasure in doing things 0   Q2: Feeling down, depressed or hopeless 0   PHQ-2 Score 0   PHQ-2 Total Score (12-17 Years)- Positive if 3 or more points; Administer PHQ-A if positive -   Q1: Little interest or pleasure in doing things Not at all   Q2: Feeling down, depressed or hopeless Not at all   PHQ-2 Score 0           Social History     Tobacco Use     Smoking status: Never     Smokeless tobacco: Never   Substance Use Topics     Alcohol use: Yes     Comment: socially         Alcohol Use 1/30/2023   Prescreen: >3 drinks/day or >7  drinks/week? Not Applicable   Prescreen: >3 drinks/day or >7 drinks/week? -       Last PSA:   PSA   Date Value Ref Range Status   02/07/2020 1.04 0 - 4 ug/L Final     Comment:     Assay Method:  Chemiluminescence using Siemens Vista analyzer     Prostate Specific Antigen Screen   Date Value Ref Range Status   01/28/2022 1.33 0.00 - 4.00 ug/L Final       Reviewed orders with patient. Reviewed health maintenance and updated orders accordingly - Yes  Labs reviewed in EPIC  BP Readings from Last 3 Encounters:   01/31/23 130/78   02/17/22 122/72   01/28/22 138/82    Wt Readings from Last 3 Encounters:   01/31/23 88.8 kg (195 lb 12.8 oz)   02/17/22 92.8 kg (204 lb 9.6 oz)   01/28/22 91.4 kg (201 lb 9.6 oz)                  Patient Active Problem List   Diagnosis     Hypertension goal BP (blood pressure) < 140/90     Hyperlipidemia with target LDL less than 130     Elevated prostate specific antigen (PSA)     Obesity     DDD (degenerative disc disease), cervical     Morbid obesity (H)     Primary osteoarthritis of right knee     No past surgical history on file.    Social History     Tobacco Use     Smoking status: Never     Smokeless tobacco: Never   Substance Use Topics     Alcohol use: Yes     Comment: socially     Family History   Problem Relation Age of Onset     C.A.D. Father 50        mi, cabg     Hypertension Father      Diabetes No family hx of            Reviewed and updated as needed this visit by clinical staff   Tobacco  Allergies  Meds              Reviewed and updated as needed this visit by Provider                 Past Medical History:   Diagnosis Date     DJD (degenerative joint disease)     BACK     High cholesterol      HTN (hypertension)      Hypertension goal BP (blood pressure) < 140/90 9/2/2011     Patient overweight      Seizures (H)       No past surgical history on file.  OB History   No obstetric history on file.       Review of Systems   Constitutional: Negative for chills and fever.   HENT:  "Negative for congestion, ear pain, hearing loss and sore throat.    Eyes: Negative for pain and visual disturbance.   Respiratory: Negative for cough and shortness of breath.    Cardiovascular: Positive for peripheral edema. Negative for chest pain and palpitations.   Gastrointestinal: Negative for abdominal pain, constipation, diarrhea, heartburn, hematochezia and nausea.   Genitourinary: Negative for dysuria, frequency, genital sores, hematuria, impotence, penile discharge and urgency.   Musculoskeletal: Positive for arthralgias and joint swelling. Negative for myalgias.   Skin: Negative for rash.   Neurological: Negative for dizziness, weakness, headaches and paresthesias.   Psychiatric/Behavioral: Negative for mood changes. The patient is not nervous/anxious.      CONSTITUTIONAL: NEGATIVE for fever, chills, change in weight  INTEGUMENTARY/SKIN: NEGATIVE for worrisome rashes, moles or lesions  EYES: NEGATIVE for vision changes or irritation  ENT: NEGATIVE for ear, mouth and throat problems  RESP: NEGATIVE for significant cough or SOB  CV: NEGATIVE for chest pain, palpitations or peripheral edema  GI: NEGATIVE for nausea, abdominal pain, heartburn, or change in bowel habits   male: negative for dysuria, hematuria, decreased urinary stream, erectile dysfunction, urethral discharge  MUSCULOSKELETAL: NEGATIVE for significant arthralgias or myalgia  NEURO: NEGATIVE for weakness, dizziness or paresthesias  ENDOCRINE: NEGATIVE for temperature intolerance, skin/hair changes  HEME/ALLERGY/IMMUNE: NEGATIVE for bleeding problems  PSYCHIATRIC: NEGATIVE for changes in mood or affect    OBJECTIVE:   BP (!) 143/84 (BP Location: Right arm, Patient Position: Sitting, Cuff Size: Adult Large)   Pulse 69   Temp 97.9  F (36.6  C) (Tympanic)   Resp 26   Ht 1.594 m (5' 2.75\")   Wt 88.8 kg (195 lb 12.8 oz)   SpO2 98%   BMI 34.96 kg/m      Physical Exam  GENERAL: healthy, alert and no distress  EYES: Eyes grossly normal to " inspection, PERRL and conjunctivae and sclerae normal  HENT: ear canals and TM's normal, nose and mouth without ulcers or lesions  NECK: no adenopathy, no asymmetry, masses, or scars and thyroid normal to palpation  RESP: lungs clear to auscultation - no rales, rhonchi or wheezes  CV: regular rate and rhythm, normal S1 S2, no S3 or S4, no murmur, click or rub, no peripheral edema and peripheral pulses strong  ABDOMEN: soft, nontender, no hepatosplenomegaly, no masses and bowel sounds normal  MS: no gross musculoskeletal defects noted, no edema  SKIN: no suspicious lesions or rashes  NEURO: Normal strength and tone, mentation intact and speech normal  PSYCH: mentation appears normal, affect normal/bright    Diagnostic Test Results:  Labs reviewed in Epic  Orders Placed This Encounter   Procedures     REVIEW OF HEALTH MAINTENANCE PROTOCOL ORDERS     Comprehensive metabolic panel (BMP + Alb, Alk Phos, ALT, AST, Total. Bili, TP)     PSA, screen     Lipid panel reflex to direct LDL Fasting       ASSESSMENT/PLAN:   (Z00.00) Routine general medical examination at a health care facility  (primary encounter diagnosis)  Comment: normal exam  Plan: Lipid panel reflex to direct LDL Fasting        Routine preventive care discussed.  Advised with diet, exercise, increase physical activity.  Advised patient to continue work on his weight and his diet.  Declined vaccinations today.  1 year annual exam follow-up recommended    (Z12.11) Screen for colon cancer  Comment:   Plan: COLOGUARD(EXACT SCIENCES), CANCELED: Fecal         colorectal cancer screen FIT - Future (S+30)        screening    (I10) Hypertension goal BP (blood pressure) < 140/90  Comment:   Plan: Comprehensive metabolic panel (BMP + Alb, Alk         Phos, ALT, AST, Total. Bili, TP), amLODIPine         (NORVASC) 10 MG tablet, metoprolol succinate ER        (TOPROL XL) 25 MG 24 hr tablet        Stable, continue with current medication,  Continue with diet and weight  "loss    (Z12.5) Screening for prostate cancer  Comment:   Plan: PSA, screen        screening    (E66.01) Morbid obesity (H)  Comment:   Plan: discussed diet and weight loss    (E78.5) Hyperlipidemia with target LDL less than 130  Comment:   Plan: Lipid panel today  Continue with diet and weight loss    (M50.30) DDD (degenerative disc disease), cervical  Comment:   Plan: follow up with Orthopedics.    (M47.816) Osteoarthritis of lumbar spine, unspecified spinal osteoarthritis complication status  Comment:   Plan: follow up with Orthopedic.    Patient has been advised of split billing requirements and indicates understanding: Yes      COUNSELING:   Reviewed preventive health counseling, as reflected in patient instructions       Regular exercise       Healthy diet/nutrition       Immunizations    Declined: Covid-19, Influenza and Zoster due to Other\" others.               Colorectal cancer screening       Prostate cancer screening        He reports that he has never smoked. He has never used smokeless tobacco.            Tyrell Solano MD  [facility]  "

## 2023-01-31 NOTE — TELEPHONE ENCOUNTER
Patient is asking if possible to if he is insulin resistant.  He reports having a hard time loosing weight.    He should wait for his current lab results to come back.  HE would likely need a separate appointment to discuss weight concerns.    Pt agreeable to wait for labs to be resulted.        Alysia Alexandre RN

## 2023-02-01 ENCOUNTER — TELEPHONE (OUTPATIENT)
Dept: FAMILY MEDICINE | Facility: CLINIC | Age: 58
End: 2023-02-01
Payer: COMMERCIAL

## 2023-02-01 DIAGNOSIS — E78.5 DYSLIPIDEMIA: ICD-10-CM

## 2023-02-01 RX ORDER — ATORVASTATIN CALCIUM 10 MG/1
10 TABLET, FILM COATED ORAL AT BEDTIME
Qty: 90 TABLET | Refills: 3 | Status: SHIPPED | OUTPATIENT
Start: 2023-02-01 | End: 2024-02-12

## 2023-02-01 NOTE — TELEPHONE ENCOUNTER
Patient returned call- reviewed labs, meds, and risk score.      He has no further questions.  Will make follow up to discuss possible weight concerns at another time.      Alysia Alexandre RN

## 2023-02-01 NOTE — TELEPHONE ENCOUNTER
"----- Message from Tyrell Solano MD sent at 2/1/2023  8:09 AM CST -----  Please call patient with his result.  Normal for prostate cancer screening.  Normal for kidney function, liver function, electrolyte, blood sugar.    Total cholesterol, LDL still elevated,  Advised patient with diet, exercise, weight loss.  Advised patient to restart his atorvastatin, I did send a new prescription for him.    Take 1 tablet at bedtime.    He does has a high risks for heart disease and strokes.    The 10-year ASCVD risk score (Jeevan DK, et al., 2019) is: 12.2%    Values used to calculate the score:      Age: 57 years      Sex: Male      Is Non- : No      Diabetic: No      Tobacco smoker: No      Systolic Blood Pressure: 130 mmHg      Is BP treated: Yes      HDL Cholesterol: 45 mg/dL      Total Cholesterol: 241 mg/dL    Ways to improve your cholesterol...     1- Eats less saturated fats (including avoiding \"trans\" fats), fatty foods.  Eat more baked food, than fried food.     2 - Eat more unsaturated fats  - found in vegetables, grains, and tree nuts.  Also by replacing butter with canola oil or olive oil.     3 - Eat more nuts.  1-2 ounces (a small handful) of almonds, walnuts, hazelnuts or pecans once a day in place of other less healthy snacks.     4 - Eat more high fiber foods - vegetables and whole grains including oat bran, oats, beans, peas, and flax seed.     5 - Eat more fish - such as salmon, tuna, mackerel, and sardines.  1 or 2 six ounce servings per week is a healthy replacement for other proteins.     6 - Exercise for at least 120 minutes per week - which is equal to 30 minutes 4 days per week.    7- avoid late meals, at least  a few hours before bedtime.       Thanks    "

## 2023-02-01 NOTE — TELEPHONE ENCOUNTER
RN left  for patient at [phone] requesting call back to clinic.    RN called to relay providers message.    Antoine Garcia RN, BSN, PHN  [facility]

## 2023-03-02 LAB — NONINV COLON CA DNA+OCC BLD SCRN STL QL: NEGATIVE

## 2023-03-03 ENCOUNTER — TELEPHONE (OUTPATIENT)
Dept: FAMILY MEDICINE | Facility: CLINIC | Age: 58
End: 2023-03-03
Payer: COMMERCIAL

## 2023-03-03 NOTE — TELEPHONE ENCOUNTER
RN called patient/family and relayed provider's message. Patient/family verbalized understanding.     Patient also asked for results to be mailed.    RN sent letter with results.    Rosanne Perez RN, BSN  [facility]: [place]

## 2023-03-03 NOTE — TELEPHONE ENCOUNTER
Attempt #1 to call patient.     RN left voicemail and requested return call to [facility] at [phone].     Tyra Talamantes RN  [facility]: [place]

## 2023-03-03 NOTE — TELEPHONE ENCOUNTER
----- Message from Tyrell Solano MD sent at 3/2/2023  4:39 PM CST -----  Please call pt with negative test  Repeat in 3 years  thanks

## 2023-03-03 NOTE — LETTER
March 3, 2023      Tim Her  [address] [place] [id number]        Dear ,    We are writing to inform you of your test results.      Resulted Orders   COLOGUARD(EXACT SCIENCES)   Result Value Ref Range    COLOGUARD-ABSTRACT Negative Negative      Comment:        NEGATIVE TEST RESULT. A negative Cologuard result indicates a low likelihood that a colorectal cancer (CRC) or advanced adenoma (adenomatous polyps with more advanced pre-malignant features)  is present. The chance that a person with a negative Cologuard test has a colorectal cancer is less than 1 in 1500 (negative predictive value >99.9%) or has an  advanced adenoma is less than  5.3% (negative predictive value 94.7%). These data are based on a prospective cross-sectional study of 10,000 individuals at average risk for colorectal cancer who were screened with both Cologuard and colonoscopy. (Mallika Shay al, N Engl J Med 2014;370(14):3701-7977) The normal value (reference range) for this assay is negative.    COLOGUARD RE-SCREENING RECOMMENDATION: Periodic colorectal cancer screening is an important part of preventive healthcare for asymptomatic individuals at average risk for colorectal cancer.  Following a negative Cologuard result, the American Cancer Society and U.S.  Multi-Society Task Force screening guidelines recommend a Cologuard re-screening interval of 3 years.   References: American Cancer Society Guideline for Colorectal Cancer Screening: https://www.cancer.org/cancer/colon-rectal-cancer/cuuqfsxmr-ebxxyljzm-egywafl/acs-recommendations.html.; Jose DK, Becca CR, Precious RIZVIK, Colorectal Cancer Screening: Recommendations for Physicians and Patients from the U.S. Multi-Society Task Force on Colorectal Cancer Screening , Am J Gastroenterology 2017; 112:3399-5336.    TEST DESCRIPTION: Composite algorithmic analysis of stool DNA-biomarkers with hemoglobin immunoassay.   Quantitative values of individual biomarkers are not  reportable and are not associated with individual biomarker result reference ranges. Cologuard is intended for colorectal cancer screening of adults of either sex, 45 years or older, who are at average-risk for colorectal cancer (CRC). Cologuard has been approved for use by the U.S. FDA. The performance of Cologuard was  established in a cross sectional study of average-risk adults aged 50-84. Cologuard performance in patients ages 45 to 49 years was estimated by sub-group analysis of near-age groups. Colonoscopies performed for a positive result may find as the most clinically significant lesion: colorectal cancer [4.0%], advanced adenoma (including sessile serrated polyps greater than or equal to 1cm diameter) [20%] or non- advanced adenoma [31%]; or no colorectal neoplasia [45%]. These estimates are derived from a prospective cross-sectional screening study of 10,000 individuals at average risk for colorectal cancer who were screened with both Cologuard and colonoscopy. (Mallika FOUNTAIN. et al, N Engl J Med 2014;370(14):4007-0470.) Cologuard may produce a false negative or false positive result (no colorectal cancer or precancerous polyp present at colonoscopy follow up). A negative Cologuard test result does not guarantee the absence of CRC or advanced adenoma (pre-cancer). The current Cologuard  screening interval is every 3 years. (American Cancer Society and U.S. Multi-Society Task Force). Cologuard performance data in a 10,000 patient pivotal study using colonoscopy as the reference method can be accessed at the following location: www.[employer].Evernote/results. Additional description of the Cologuard test process, warnings and precautions can be found at www.cologuard.com.         If you have any questions or concerns, please call the clinic at the number listed above.       Sincerely,              [facility] [place] RN Team

## 2023-04-27 DIAGNOSIS — Z00.00 ROUTINE GENERAL MEDICAL EXAMINATION AT A HEALTH CARE FACILITY: ICD-10-CM

## 2023-04-27 DIAGNOSIS — I10 ESSENTIAL HYPERTENSION WITH GOAL BLOOD PRESSURE LESS THAN 140/90: ICD-10-CM

## 2023-04-27 DIAGNOSIS — E78.5 DYSLIPIDEMIA: ICD-10-CM

## 2023-04-27 RX ORDER — ASPIRIN 81 MG/1
81 TABLET, CHEWABLE ORAL DAILY
Qty: 100 TABLET | Refills: 3 | Status: SHIPPED | OUTPATIENT
Start: 2023-04-27 | End: 2024-02-12

## 2023-06-22 ENCOUNTER — TELEPHONE (OUTPATIENT)
Dept: ORTHOPEDICS | Facility: CLINIC | Age: 58
End: 2023-06-22
Payer: COMMERCIAL

## 2023-06-22 DIAGNOSIS — M17.11 PRIMARY OSTEOARTHRITIS OF RIGHT KNEE: Primary | ICD-10-CM

## 2023-06-22 NOTE — TELEPHONE ENCOUNTER
[facility] spoke with patient regarding his right knee. Patient understands that he was informed by Dr. Fields on 12/30/2020 that the patient should be seen with a joint replacement surgeon based on his imaging.    Patient reports he has not moved forward with that request.    Patient is inquiring if Dr. 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 [facility] informed patient she was unsure if Dr. Fields would be able to complete this request as he has not seen him in clinic for over 2.5 years.     Patient requested an appointment with Dr. Fields, which [facility] did schedule. [facility] explained that Dr. Fields is out of the office until mid July 2023. Patient inquired about scheduling with another surgeon so he would not have wait. ATC recommended that he contact the orthopedic scheduling number to request an appointment with a joint replacement surgeon. ATC placed a new referral for this request.    [facility] offered patient the medical records number to request all his records from appointment on 12/30/2020, patient declined as he has these records already.     Patient had no further questions.    FINESSE Tellez

## 2023-06-22 NOTE — TELEPHONE ENCOUNTER
Patient is asking for a call from Dr. Fields's team regarding his visit in 2020, and asking about paperwork. Patient knows this was quite a while ago, but is asking for a call at [phone], after 11am is preferred, and okay to leave a detailed message.    Thank you!

## 2023-07-20 DIAGNOSIS — M17.11 PRIMARY OSTEOARTHRITIS OF RIGHT KNEE: Primary | ICD-10-CM

## 2023-07-24 NOTE — TELEPHONE ENCOUNTER
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

## 2023-07-26 ENCOUNTER — PRE VISIT (OUTPATIENT)
Dept: ORTHOPEDICS | Facility: CLINIC | Age: 58
End: 2023-07-26

## 2023-07-26 ENCOUNTER — ANCILLARY PROCEDURE (OUTPATIENT)
Dept: GENERAL RADIOLOGY | Facility: CLINIC | Age: 58
End: 2023-07-26
Attending: ORTHOPAEDIC SURGERY
Payer: COMMERCIAL

## 2023-07-26 ENCOUNTER — OFFICE VISIT (OUTPATIENT)
Dept: ORTHOPEDICS | Facility: CLINIC | Age: 58
End: 2023-07-26
Payer: COMMERCIAL

## 2023-07-26 VITALS — WEIGHT: 195 LBS | HEIGHT: 63 IN | BODY MASS INDEX: 34.55 KG/M2

## 2023-07-26 DIAGNOSIS — M17.11 PRIMARY OSTEOARTHRITIS OF RIGHT KNEE: ICD-10-CM

## 2023-07-26 DIAGNOSIS — M17.11 PRIMARY OSTEOARTHRITIS OF RIGHT KNEE: Primary | ICD-10-CM

## 2023-07-26 PROCEDURE — 73562 X-RAY EXAM OF KNEE 3: CPT | Mod: RT | Performed by: RADIOLOGY

## 2023-07-26 PROCEDURE — 99214 OFFICE O/P EST MOD 30 MIN: CPT | Performed by: ORTHOPAEDIC SURGERY

## 2023-07-26 ASSESSMENT — ENCOUNTER SYMPTOMS
MUSCLE WEAKNESS: 0
JOINT SWELLING: 0
MYALGIAS: 1
BACK PAIN: 1
NECK PAIN: 1
STIFFNESS: 1
MUSCLE CRAMPS: 0
ARTHRALGIAS: 1

## 2023-07-26 NOTE — NURSING NOTE
"Reason For Visit:   Chief Complaint   Patient presents with    Consult     Right knee     ?  No  Occupation .  Currently working? No.  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  Date of injury: Chronic- several years  Type of injury: No injury.  Date of surgery: None  Type of surgery: None.    He has had chronic knee pain for several years with no injury. Previously seen in 2020. He has pain with walking or twisting knee. Reports no catching, locking or instability.     SANE Score  Left Knee: 80  Right Knee: 50    Pain Assessment  Patient Currently in Pain: Yes  0-10 Pain Scale: 3  Primary Pain Location: Knee    Ht 1.594 m (5' 2.75\")   Wt 88.5 kg (195 lb)   BMI 34.82 kg/m           Allergies   Allergen Reactions    Lisinopril Hives     angioedema  Other reaction(s): Edema,generalized    Bees     Flurbiprofen Itching       Current Outpatient Medications   Medication    amLODIPine (NORVASC) 10 MG tablet    aspirin (ASA) 81 MG chewable tablet    atorvastatin (LIPITOR) 10 MG tablet    EPINEPHrine (ANY BX GENERIC EQUIV) 0.3 MG/0.3ML injection 2-pack    KRILL OIL PO    metoprolol succinate ER (TOPROL XL) 25 MG 24 hr tablet    MULTI VITAMIN MENS PO TABS     No current facility-administered medications for this visit.         Elena Thompson, ATC    "

## 2023-07-26 NOTE — LETTER
"    7/26/2023         RE: Tim Her  [address] [facility] [address]        Dear Colleague,    Thank you for referring your patient, Tim Her, to the [facility] [place]. Please see a copy of my visit note below.    CHIEF CONCERN:   Chief Complaint   Patient presents with    Consult     Right knee        HISTORY:   57M with pmhx of right knee osteoarthritis presenting as a follow-up patient for chronic R knee pain that has been bothering him for several years. Was last seen in 2020 for the same knee pain with no inciting event. He had an XR and MRI ordered by Dr. Tyrell Solano and was referred to our office for evaluation of R knee medial meniscus tear. Per patient, treatment options were discussed including a right total knee arthroplasty due to the level of osteoarthritis in his knee. He elected to proceed with observation and agree to follow-up PRN.     Today, he is concerned about etiology of his medial meniscus tear/right knee osteoarthritis and wanted more information regarding if his occupation as a  caused his knee pain and injury. He is considering applying for worker compensation.     EXAM:   General: Awake, Alert and Oriented, No acute Distress. Articulate and Interactive  Ht 1.594 m (5' 2.75\")   Wt 88.5 kg (195 lb)   BMI 34.82 kg/m       Right lower extremity and knee exam:  Skin is Warm and Well perfused, no suggestion of infection, no previous incisions  Pain over medial joint line and even worse over proximal medial tibial plateau.  Minimal lateral joint line pain.  Crepitus in the patellofemoral joint with compression.  Stable Lachman.  Stable to varus and valgus.  EHL/FHL/TA/GS 5/5  Sensation intact L3-S1  Brisk distal capillary refill     IMAGING:  Plain Radiographs: AP, lateral, and sunrise views of right knee ordered and reviewed today compared to films on 12/30/2020 demonstrating severe osteoarthritic changes consistent with " previous films on the medial knee. There is also worsening patella osteoarthritic changes.      ASSESSMENT:  55 year old male with right severe medial compartment osteoarthritis    PLAN:  Discussion regarding etiology of osteoarthritis and medial meniscus tear. I discussed with him that osteoarthritic knee pain is a degenerative knee disease that is quite common specifically discussing how meniscus tear may accelerate this degenerative process. Regarding whether this was related to work, I discussed the difficulty associated with this question as he had no inciting event relating to the knee pain.   We discussed treatment options of his osteoarthritis including observation, NSAIDs, corticosteroid injections, and total knee arthroplasty. Patient understood the risks and benefits of all of these options. Given the mild severity of his symptoms, he is opting to observation. Happy to offer him a corticosteroid injection anytime.  Regarding TKA, if patient decides to proceed with surgery, I am more than happy to refer him to one of my colleagues that specializes in TKAs. He may return anytime to discuss TKAs  Follow-up PRN.   Will place medial compartment  brace, followup with me as needed          Again, thank you for allowing me to participate in the care of your patient.        Sincerely,        Sylvester Fields MD

## 2023-07-26 NOTE — PROGRESS NOTES
"CHIEF CONCERN:   Chief Complaint   Patient presents with    Consult     Right knee        HISTORY:   57M with pmhx of right knee osteoarthritis presenting as a follow-up patient for chronic R knee pain that has been bothering him for several years. Was last seen in 2020 for the same knee pain with no inciting event. He had an XR and MRI ordered by Dr. Tyrell Solano and was referred to our office for evaluation of R knee medial meniscus tear. Per patient, treatment options were discussed including a right total knee arthroplasty due to the level of osteoarthritis in his knee. He elected to proceed with observation and agree to follow-up PRN.     Today, he is concerned about etiology of his medial meniscus tear/right knee osteoarthritis and wanted more information regarding if his occupation as a  caused his knee pain and injury. He is considering applying for worker compensation.     EXAM:   General: Awake, Alert and Oriented, No acute Distress. Articulate and Interactive  Ht 1.594 m (5' 2.75\")   Wt 88.5 kg (195 lb)   BMI 34.82 kg/m       Right lower extremity and knee exam:  Skin is Warm and Well perfused, no suggestion of infection, no previous incisions  Pain over medial joint line and even worse over proximal medial tibial plateau.  Minimal lateral joint line pain.  Crepitus in the patellofemoral joint with compression.  Stable Lachman.  Stable to varus and valgus.  EHL/FHL/TA/GS 5/5  Sensation intact L3-S1  Brisk distal capillary refill     IMAGING:  Plain Radiographs: AP, lateral, and sunrise views of right knee ordered and reviewed today compared to films on 12/30/2020 demonstrating severe osteoarthritic changes consistent with previous films on the medial knee. There is also worsening patella osteoarthritic changes.      ASSESSMENT:  55 year old male with right severe medial compartment osteoarthritis    PLAN:  Discussion regarding etiology of osteoarthritis and medial meniscus tear. I " discussed with him that osteoarthritic knee pain is a degenerative knee disease that is quite common specifically discussing how meniscus tear may accelerate this degenerative process. Regarding whether this was related to work, I discussed the difficulty associated with this question as he had no inciting event relating to the knee pain.   We discussed treatment options of his osteoarthritis including observation, NSAIDs, corticosteroid injections, and total knee arthroplasty. Patient understood the risks and benefits of all of these options. Given the mild severity of his symptoms, he is opting to observation. Happy to offer him a corticosteroid injection anytime.  Regarding TKA, if patient decides to proceed with surgery, I am more than happy to refer him to one of my colleagues that specializes in TKAs. He may return anytime to discuss TKAs  Follow-up PRN.   Will place medial compartment  brace, followup with me as needed

## 2023-08-03 ENCOUNTER — TELEPHONE (OUTPATIENT)
Dept: FAMILY MEDICINE | Facility: CLINIC | Age: 58
End: 2023-08-03
Payer: COMMERCIAL

## 2023-08-03 DIAGNOSIS — Z91.030 BEE STING ALLERGY: Primary | ICD-10-CM

## 2023-08-03 RX ORDER — EPINEPHRINE 0.3 MG/.3ML
0.3 INJECTION SUBCUTANEOUS
Qty: 2 EACH | Refills: 0 | Status: SHIPPED | OUTPATIENT
Start: 2023-08-03 | End: 2024-06-05

## 2023-08-03 NOTE — TELEPHONE ENCOUNTER
Prior Authorization Retail Medication Request    Medication/Dose: EPINEPHrine (ANY BX GENERIC EQUIV) 0.3 MG/0.3ML injection 2-pack  ICD code (if different than what is on RX):    Previously Tried and Failed:   Rationale:      Insurance Name:    Insurance ID:        Pharmacy Information (if different than what is on RX)  Name:    Phone:      Login to [email or web address] to initiate PA or switch to alternative medication.  KEY: [id number]

## 2023-08-08 NOTE — TELEPHONE ENCOUNTER
Prior Authorization Not Needed per Insurance    Medication: EPINEPHrine (ANY BX GENERIC EQUIV) 0.3 MG/0.3ML injection 2-pack  Insurance Company: [employer] [facility] - Phone [phone] Fax [phone]  Expected CoPay:      Pharmacy Filling the Rx: [facility] [address] [place] [address]  Pharmacy Notified: Yes  Patient Notified: Yes    Medication is covered under plan. [facility] plans prefer [employer] brand of the Epenephrine pens.  Called [facility], gave them this information.      Pharmacist states they already have a paid claim, patient picked up yesterday.  Closing encounter.

## 2023-10-12 NOTE — TELEPHONE ENCOUNTER
Routing to team  to contact patient to schedule appointment          Mary Anne Peterson RN  [facility]         Apixaban/Eliquis increases your risk for bleeding. Notify your doctor if you experience any of the following side effects: bleeding, coughing or vomiting blood, red or black stool, unexpected pain or swelling, itching or hives, chest pain, chest tightness, trouble breathing, changes in how much or how often you urinate, red or pink urine, numbness or tingling in your feet, or unusual muscle weakness. When Apixaban/Eliquis is taken with other medicines, they can affect how it works. Taking other medications such as aspirin, blood thinners, nonsteroidal anti-inflammatories, and medications that treat depression can increase your risk of bleeding. It is very important to tell your health care provider about all of the other medicines, including over-the-counter medications, herbs, and vitamins you are taking. DO NOT start, stop, or change the dosage of any medicine, including over-the-counter medicines, vitamins, and herbal products without your doctor’s approval. Any products containing aspirin or are nonsteroidal anti-inflammatories lessen the blood’s ability to form clots and add to the effect of Apixaban/Eliquis. Never take aspirin or medicines that contain aspirin without speaking to your doctor.
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
## 2023-10-30 ENCOUNTER — OFFICE VISIT (OUTPATIENT)
Dept: ORTHOPEDICS | Facility: CLINIC | Age: 58
End: 2023-10-30
Payer: COMMERCIAL

## 2023-10-30 ENCOUNTER — PRE VISIT (OUTPATIENT)
Dept: ORTHOPEDICS | Facility: CLINIC | Age: 58
End: 2023-10-30

## 2023-10-30 ENCOUNTER — ANCILLARY PROCEDURE (OUTPATIENT)
Dept: GENERAL RADIOLOGY | Facility: CLINIC | Age: 58
End: 2023-10-30
Attending: FAMILY MEDICINE
Payer: COMMERCIAL

## 2023-10-30 VITALS — WEIGHT: 195 LBS | HEIGHT: 63 IN | BODY MASS INDEX: 34.55 KG/M2

## 2023-10-30 DIAGNOSIS — S83.207A TEAR OF MENISCUS OF LEFT KNEE, UNSPECIFIED MENISCUS, UNSPECIFIED TEAR TYPE, UNSPECIFIED WHETHER OLD OR CURRENT TEAR: Primary | ICD-10-CM

## 2023-10-30 DIAGNOSIS — M54.9 ACUTE UPPER BACK PAIN: Primary | ICD-10-CM

## 2023-10-30 DIAGNOSIS — M25.512 LEFT SHOULDER PAIN: ICD-10-CM

## 2023-10-30 DIAGNOSIS — M50.30 DDD (DEGENERATIVE DISC DISEASE), CERVICAL: ICD-10-CM

## 2023-10-30 PROCEDURE — 99214 OFFICE O/P EST MOD 30 MIN: CPT | Performed by: ORTHOPAEDIC SURGERY

## 2023-10-30 PROCEDURE — 73030 X-RAY EXAM OF SHOULDER: CPT | Mod: LT | Performed by: RADIOLOGY

## 2023-10-30 PROCEDURE — 99203 OFFICE O/P NEW LOW 30 MIN: CPT | Performed by: FAMILY MEDICINE

## 2023-10-30 RX ORDER — CYCLOBENZAPRINE HCL 10 MG
10 TABLET ORAL 2 TIMES DAILY PRN
COMMUNITY
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
## 2023-11-06 ENCOUNTER — TRANSFERRED RECORDS (OUTPATIENT)
Dept: HEALTH INFORMATION MANAGEMENT | Facility: CLINIC | Age: 58
End: 2023-11-06
Payer: COMMERCIAL

## 2023-12-04 ENCOUNTER — VIRTUAL VISIT (OUTPATIENT)
Dept: ORTHOPEDICS | Facility: CLINIC | Age: 58
End: 2023-12-04
Payer: COMMERCIAL

## 2023-12-04 DIAGNOSIS — G89.29 CHRONIC PAIN OF LEFT KNEE: Primary | ICD-10-CM

## 2023-12-04 DIAGNOSIS — M25.562 CHRONIC PAIN OF LEFT KNEE: Primary | ICD-10-CM

## 2023-12-04 PROCEDURE — 99214 OFFICE O/P EST MOD 30 MIN: CPT | Mod: 95 | Performed by: ORTHOPAEDIC SURGERY

## 2023-12-04 RX ORDER — DICLOFENAC SODIUM 75 MG/1
75 TABLET, DELAYED RELEASE ORAL 2 TIMES DAILY
Qty: 60 TABLET | Refills: 0 | Status: SHIPPED | OUTPATIENT
Start: 2023-12-04 | End: 2024-02-12
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
## 2024-02-12 ENCOUNTER — OFFICE VISIT (OUTPATIENT)
Dept: FAMILY MEDICINE | Facility: CLINIC | Age: 59
End: 2024-02-12
Payer: COMMERCIAL

## 2024-02-12 VITALS
BODY MASS INDEX: 33.84 KG/M2 | RESPIRATION RATE: 24 BRPM | DIASTOLIC BLOOD PRESSURE: 80 MMHG | OXYGEN SATURATION: 97 % | TEMPERATURE: 98.3 F | SYSTOLIC BLOOD PRESSURE: 136 MMHG | HEIGHT: 63 IN | WEIGHT: 191 LBS | HEART RATE: 61 BPM

## 2024-02-12 DIAGNOSIS — Z00.00 ROUTINE GENERAL MEDICAL EXAMINATION AT A HEALTH CARE FACILITY: Primary | ICD-10-CM

## 2024-02-12 DIAGNOSIS — M50.30 DDD (DEGENERATIVE DISC DISEASE), CERVICAL: ICD-10-CM

## 2024-02-12 DIAGNOSIS — Z12.5 SCREENING FOR PROSTATE CANCER: ICD-10-CM

## 2024-02-12 DIAGNOSIS — I10 HYPERTENSION GOAL BP (BLOOD PRESSURE) < 140/90: ICD-10-CM

## 2024-02-12 DIAGNOSIS — E78.5 DYSLIPIDEMIA: ICD-10-CM

## 2024-02-12 LAB
ALBUMIN SERPL BCG-MCNC: 4.9 G/DL (ref 3.5–5.2)
ALP SERPL-CCNC: 79 U/L (ref 40–150)
ALT SERPL W P-5'-P-CCNC: 19 U/L (ref 0–70)
ANION GAP SERPL CALCULATED.3IONS-SCNC: 15 MMOL/L (ref 7–15)
AST SERPL W P-5'-P-CCNC: 24 U/L (ref 0–45)
BILIRUB SERPL-MCNC: 0.4 MG/DL
BUN SERPL-MCNC: 12.7 MG/DL (ref 8–23)
CALCIUM SERPL-MCNC: 10.1 MG/DL (ref 8.8–10.2)
CHLORIDE SERPL-SCNC: 103 MMOL/L (ref 98–107)
CHOLEST SERPL-MCNC: 232 MG/DL
CREAT SERPL-MCNC: 0.78 MG/DL (ref 0.67–1.17)
DEPRECATED HCO3 PLAS-SCNC: 24 MMOL/L (ref 22–29)
EGFRCR SERPLBLD CKD-EPI 2021: >90 ML/MIN/1.73M2
FASTING STATUS PATIENT QL REPORTED: NO
GLUCOSE SERPL-MCNC: 95 MG/DL (ref 70–99)
HDLC SERPL-MCNC: 52 MG/DL
LDLC SERPL CALC-MCNC: 157 MG/DL
NONHDLC SERPL-MCNC: 180 MG/DL
POTASSIUM SERPL-SCNC: 4.1 MMOL/L (ref 3.4–5.3)
PROT SERPL-MCNC: 7.9 G/DL (ref 6.4–8.3)
PSA SERPL DL<=0.01 NG/ML-MCNC: 1.37 NG/ML (ref 0–4.5)
SODIUM SERPL-SCNC: 142 MMOL/L (ref 135–145)
TRIGL SERPL-MCNC: 115 MG/DL

## 2024-02-12 PROCEDURE — 80053 COMPREHEN METABOLIC PANEL: CPT | Performed by: FAMILY MEDICINE

## 2024-02-12 PROCEDURE — 36415 COLL VENOUS BLD VENIPUNCTURE: CPT | Performed by: FAMILY MEDICINE

## 2024-02-12 PROCEDURE — 99396 PREV VISIT EST AGE 40-64: CPT | Performed by: FAMILY MEDICINE

## 2024-02-12 PROCEDURE — 80061 LIPID PANEL: CPT | Performed by: FAMILY MEDICINE

## 2024-02-12 PROCEDURE — G0103 PSA SCREENING: HCPCS | Performed by: FAMILY MEDICINE

## 2024-02-12 RX ORDER — METOPROLOL SUCCINATE 25 MG/1
25 TABLET, EXTENDED RELEASE ORAL DAILY
Qty: 90 TABLET | Refills: 3 | Status: SHIPPED | OUTPATIENT
Start: 2024-02-12

## 2024-02-12 RX ORDER — ATORVASTATIN CALCIUM 10 MG/1
10 TABLET, FILM COATED ORAL AT BEDTIME
Qty: 90 TABLET | Refills: 3 | Status: SHIPPED | OUTPATIENT
Start: 2024-02-12 | End: 2024-02-13

## 2024-02-12 RX ORDER — AMLODIPINE BESYLATE 10 MG/1
TABLET ORAL
Qty: 90 TABLET | Refills: 3 | Status: SHIPPED | OUTPATIENT
Start: 2024-02-12

## 2024-02-12 RX ORDER — ASPIRIN 81 MG/1
81 TABLET, CHEWABLE ORAL DAILY
Qty: 100 TABLET | Refills: 3 | Status: SHIPPED | OUTPATIENT
Start: 2024-02-12

## 2024-02-12 SDOH — HEALTH STABILITY: PHYSICAL HEALTH: ON AVERAGE, HOW MANY MINUTES DO YOU ENGAGE IN EXERCISE AT THIS LEVEL?: 40 MIN

## 2024-02-12 SDOH — HEALTH STABILITY: PHYSICAL HEALTH: ON AVERAGE, HOW MANY DAYS PER WEEK DO YOU ENGAGE IN MODERATE TO STRENUOUS EXERCISE (LIKE A BRISK WALK)?: 5 DAYS

## 2024-02-12 ASSESSMENT — SOCIAL DETERMINANTS OF HEALTH (SDOH): HOW OFTEN DO YOU GET TOGETHER WITH FRIENDS OR RELATIVES?: THREE TIMES A WEEK

## 2024-02-12 ASSESSMENT — PAIN SCALES - GENERAL: PAINLEVEL: NO PAIN (0)

## 2024-02-12 NOTE — PATIENT INSTRUCTIONS
Your Health Risk Assessment indicates you feel you are not in good health    A healthy lifestyle helps keep the body fit and the mind alert. It helps protect you from disease, helps you fight disease, and helps prevent chronic disease (disease that doesn't go away) from getting worse. This is important as you get older and begin to notice twinges in muscles and joints and a decline in the strength and stamina you once took for granted. A healthy lifestyle includes good healthcare, good nutrition, weight control, recreation, and regular exercise. Avoid harmful substances and do what you can to keep safe. Another part of a healthy lifestyle is stay mentally active and socially involved.    Good healthcare   Have a wellness visit every year.   If you have new symptoms, let us know right away. Don't wait until the next checkup.   Take medicines exactly as prescribed and keep your medicines in a safe place. Tell us if your medicine causes problems.   Healthy diet and weight control   Eat 3 or 4 small, nutritious, low-fat, high-fiber meals a day. Include a variety of fruits, vegetables, and whole-grain foods.   Make sure you get enough calcium in your diet. Calcium, vitamin D, and exercise help prevent osteoporosis (bone thinning).   If you live alone, try eating with others when you can. That way you get a good meal and have company while you eat it.   Try to keep a healthy weight. If you eat more calories than your body uses for energy, it will be stored as fat and you will gain weight.     Recreation   Recreation is not limited to sports and team events. It includes any activity that provides relaxation, interest, enjoyment, and exercise. Recreation provides an outlet for physical, mental, and social energy. It can give a sense of worth and achievement. It can help you stay healthy.    Mental Exercise and Social Involvement  Mental and emotional health is as important as physical health. Keep in touch with friends and  family. Stay as active as possible. Continue to learn and challenge yourself.   Things you can do to stay mentally active are:  Learn something new, like a foreign language or musical instrument.   Play SCRABBLE or do crossword puzzles. If you cannot find people to play these games with you at home, you can play them with others on your computer through the Internet.   Join a games club--anything from card games to chess or checkers or lawn bowling.   Start a new hobby.   Go back to school.   Volunteer.   Read.   Keep up with world events.  Eating Healthy Foods: Care Instructions  With every meal, you can make healthy food choices. Try to eat a variety of fruits, vegetables, whole grains, lean proteins, and low-fat dairy products. This can help you get the right balance of nutrients, including vitamins and minerals. Small changes add up over time. You can start by adding one healthy food to your meals each day.    Try to make half your plate fruits and vegetables, one-fourth whole grains, and one-fourth lean proteins. Try including dairy with your meals.   Eat more fruits and vegetables. Try to have them with most meals and snacks.   Foods for healthy eating    Fruits    These can be fresh, frozen, canned, or dried.  Try to choose whole fruit rather than fruit juice.  Eat a variety of colors.    Vegetables    These can be fresh, frozen, canned, or dried.  Beans, peas, and lentils count too.    Whole grains    Choose whole-grain breads, cereals, and noodles.  Try brown rice.    Lean proteins    These can include lean meat, poultry, fish, and eggs.  You can also have tofu, beans, peas, lentils, nuts, and seeds.    Dairy    Try milk, yogurt, and cheese.  Choose low-fat or fat-free when you can.  If you need to, use lactose-free milk or fortified plant-based milk products, such as soy milk.    Water    Drink water when you're thirsty.  Limit sugar-sweetened drinks, including soda, fruit drinks, and sports drinks.  Where  "can you learn more?  Go to https://www.[employer].net/patiented  Enter T756 in the search box to learn more about \"Eating Healthy Foods: Care Instructions.\"  Current as of: February 28, 2023               Content Version: 13.8    9867-8835 [employer].   Care instructions adapted under license by your healthcare professional. If you have questions about a medical condition or this instruction, always ask your healthcare professional. [employer] disclaims any warranty or liability for your use of this information.      Preventing Falls: Care Instructions  Injuries and health problems such as trouble walking or poor eyesight can increase your risk of falling. So can some medicines. But there are things you can do to help prevent falls. You can exercise to get stronger. You can also arrange your home to make it safer.    Talk to your doctor about the medicines you take. Ask if any of them increase the risk of falls and whether they can be changed or stopped.   Try to exercise regularly. It can help improve your strength and balance. This can help lower your risk of falling.     Practice fall safety and prevention.    Wear low-heeled shoes that fit well and give your feet good support. Talk to your doctor if you have foot problems that make this hard.  Carry a cellphone or wear a medical alert device that you can use to call for help.  Use stepladders instead of chairs to reach high objects. Don't climb if you're at risk for falls. Ask for help, if needed.  Wear the correct eyeglasses, if you need them.    Make your home safer.    Remove rugs, cords, clutter, and furniture from walkways.  Keep your house well lit. Use night-lights in hallways and bathrooms.  Install and use sturdy handrails on stairways.  Wear nonskid footwear, even inside. Don't walk barefoot or in socks without shoes.    Be safe outside.    Use handrails, curb cuts, and ramps whenever possible.  Keep your hands free by using " "a shoulder bag or backpack.  Try to walk in well-lit areas. Watch out for uneven ground, changes in pavement, and debris.  Be careful in the winter. Walk on the grass or gravel when sidewalks are slippery. Use de-icer on steps and walkways. Add non-slip devices to shoes.    Put grab bars and nonskid mats in your shower or tub and near the toilet. Try to use a shower chair or bath bench when bathing.   Get into a tub or shower by putting in your weaker leg first. Get out with your strong side first. Have a phone or medical alert device in the bathroom with you.   Where can you learn more?  Go to https://www.[employer].net/patiented  Enter G117 in the search box to learn more about \"Preventing Falls: Care Instructions.\"  Current as of: July 18, 2023               Content Version: 13.8    1995-7851 [employer].   Care instructions adapted under license by your healthcare professional. If you have questions about a medical condition or this instruction, always ask your healthcare professional. [employer] disclaims any warranty or liability for your use of this information.      Learning About Stress  What is stress?     Stress is your body's response to a hard situation. Your body can have a physical, emotional, or mental response. Stress is a fact of life for most people, and it affects everyone differently. What causes stress for you may not be stressful for someone else.  A lot of things can cause stress. You may feel stress when you go on a job interview, take a test, or run a race. This kind of short-term stress is normal and even useful. It can help you if you need to work hard or react quickly. For example, stress can help you finish an important job on time.  Long-term stress is caused by ongoing stressful situations or events. Examples of long-term stress include long-term health problems, ongoing problems at work, or conflicts in your family. Long-term stress can harm your " health.  How does stress affect your health?  When you are stressed, your body responds as though you are in danger. It makes hormones that speed up your heart, make you breathe faster, and give you a burst of energy. This is called the fight-or-flight stress response. If the stress is over quickly, your body goes back to normal and no harm is done.  But if stress happens too often or lasts too long, it can have bad effects. Long-term stress can make you more likely to get sick, and it can make symptoms of some diseases worse. If you tense up when you are stressed, you may develop neck, shoulder, or low back pain. Stress is linked to high blood pressure and heart disease.  Stress also harms your emotional health. It can make you sosa, tense, or depressed. Your relationships may suffer, and you may not do well at work or school.  What can you do to manage stress?  You can try these things to help manage stress:   Do something active. Exercise or activity can help reduce stress. Walking is a great way to get started. Even everyday activities such as housecleaning or yard work can help.  Try yoga or gem chi. These techniques combine exercise and meditation. You may need some training at first to learn them.  Do something you enjoy. For example, listen to music or go to a movie. Practice your hobby or do volunteer work.  Meditate. This can help you relax, because you are not worrying about what happened before or what may happen in the future.  Do guided imagery. Imagine yourself in any setting that helps you feel calm. You can use online videos, books, or a teacher to guide you.  Do breathing exercises. For example:  From a standing position, bend forward from the waist with your knees slightly bent. Let your arms dangle close to the floor.  Breathe in slowly and deeply as you return to a standing position. Roll up slowly and lift your head last.  Hold your breath for just a few seconds in the standing  "position.  Breathe out slowly and bend forward from the waist.  Let your feelings out. Talk, laugh, cry, and express anger when you need to. Talking with supportive friends or family, a counselor, or a goran leader about your feelings is a healthy way to relieve stress. Avoid discussing your feelings with people who make you feel worse.  Write. It may help to write about things that are bothering you. This helps you find out how much stress you feel and what is causing it. When you know this, you can find better ways to cope.  What can you do to prevent stress?  You might try some of these things to help prevent stress:  Manage your time. This helps you find time to do the things you want and need to do.  Get enough sleep. Your body recovers from the stresses of the day while you are sleeping.  Get support. Your family, friends, and community can make a difference in how you experience stress.  Limit your news feed. Avoid or limit time on social media or news that may make you feel stressed.  Do something active. Exercise or activity can help reduce stress. Walking is a great way to get started.  Where can you learn more?  Go to https://www.[employer].net/patiented  Enter N032 in the search box to learn more about \"Learning About Stress.\"  Current as of: February 26, 2023               Content Version: 13.8    2863-5686 [employer].   Care instructions adapted under license by your healthcare professional. If you have questions about a medical condition or this instruction, always ask your healthcare professional. [employer] disclaims any warranty or liability for your use of this information.      Substance Use Disorder: Care Instructions  Overview     You can improve your life and health by stopping your use of alcohol or drugs. When you don't drink or use drugs, you may feel and sleep better. You may get along better with your family, friends, and coworkers. There are medicines and " programs that can help with substance use disorder.  How can you care for yourself at home?  Here are some ways to help you stay sober and prevent relapse.  If you have been given medicine to help keep you sober or reduce your cravings, be sure to take it exactly as prescribed.  Talk to your doctor about programs that can help you stop using drugs or drinking alcohol.  Do not keep alcohol or drugs in your home.  Plan ahead. Think about what you'll say if other people ask you to drink or use drugs. Try not to spend time with people who drink or use drugs.  Use the time and money spent on drinking or drugs to do something that's important to you.  Preventing a relapse  Have a plan to deal with relapse. Learn to recognize changes in your thinking that lead you to drink or use drugs. Get help before you start to drink or use drugs again.  Try to stay away from situations, friends, or places that may lead you to drink or use drugs.  If you feel the need to drink alcohol or use drugs again, seek help right away. Call a trusted friend or family member. Some people get support from organizations such as Narcotics Anonymous or [employer] or from treatment facilities.  If you relapse, get help as soon as you can. Some people make a plan with another person that outlines what they want that person to do for them if they relapse. The plan usually includes how to handle the relapse and who to notify in case of relapse.  Don't give up. Remember that a relapse doesn't mean that you have failed. Use the experience to learn the triggers that lead you to drink or use drugs. Then quit again. Recovery is a lifelong process. Many people have several relapses before they are able to quit for good.  Follow-up care is a key part of your treatment and safety. Be sure to make and go to all appointments, and call your doctor if you are having problems. It's also a good idea to know your test results and keep a list of the medicines you  "take.  When should you call for help?   Call 911  anytime you think you may need emergency care. For example, call if you or someone else:    Has overdosed or has withdrawal signs. Be sure to tell the emergency workers that you are or someone else is using or trying to quit using drugs. Overdose or withdrawal signs may include:  Losing consciousness.  Seizure.  Seeing or hearing things that aren't there (hallucinations).     Is thinking or talking about suicide or harming others.   Where to get help 24 hours a day, 7 days a week   If you or someone you know talks about suicide, self-harm, a mental health crisis, a substance use crisis, or any other kind of emotional distress, get help right away. You can:    Call the Suicide and Crisis Lifeline at 988.     Call 3-987-415-IIIT (1-552.189.7367).     Text HOME to 450475 to access the Crisis Text Line.   Consider saving these numbers in your phone.  Go to [employer] for more information or to chat online.  Call your doctor now or seek immediate medical care if:    You are having withdrawal symptoms. These may include nausea or vomiting, sweating, shakiness, and anxiety.   Watch closely for changes in your health, and be sure to contact your doctor if:    You have a relapse.     You need more help or support to stop.   Where can you learn more?  Go to https://www.[employer].net/patiented  Enter H573 in the search box to learn more about \"Substance Use Disorder: Care Instructions.\"  Current as of: March 21, 2023               Content Version: 13.8    2564-3838 [employer].   Care instructions adapted under license by your healthcare professional. If you have questions about a medical condition or this instruction, always ask your healthcare professional. [employer] disclaims any warranty or liability for your use of this information.      Preventive Care Advice   This is general advice given by our system to help you stay healthy. However, " your care team may have specific advice just for you. Please talk to your care team about your preventive care needs.  Nutrition  Eat 5 or more servings of fruits and vegetables each day.  Try wheat bread, brown rice and whole grain pasta (instead of white bread, rice, and pasta).  Get enough calcium and vitamin D. Check the label on foods and aim for 100% of the RDA (recommended daily allowance).  Lifestyle  Exercise at least 150 minutes each week  (30 minutes a day, 5 days a week).  Do muscle strengthening activities 2 days a week. These help control your weight and prevent disease.  No smoking.  Wear sunscreen to prevent skin cancer.  Have a dental exam and cleaning every 6 months.  Yearly exams  See your health care team every year to talk about:  Any changes in your health.  Any medicines your care team has prescribed.  Preventive care, family planning, and ways to prevent chronic diseases.  Shots (vaccines)   HPV shots (up to age 26), if you've never had them before.  Hepatitis B shots (up to age 59), if you've never had them before.  COVID-19 shot: Get this shot when it's due.  Flu shot: Get a flu shot every year.  Tetanus shot: Get a tetanus shot every 10 years.  Pneumococcal, hepatitis A, and RSV shots: Ask your care team if you need these based on your risk.  Shingles shot (for age 50 and up)  General health tests  Diabetes screening:  Starting at age 35, Get screened for diabetes at least every 3 years.  If you are younger than age 35, ask your care team if you should be screened for diabetes.  Cholesterol test: At age 39, start having a cholesterol test every 5 years, or more often if advised.  Bone density scan (DEXA): At age 50, ask your care team if you should have this scan for osteoporosis (brittle bones).  Hepatitis C: Get tested at least once in your life.  STIs (sexually transmitted infections)  Before age 24: Ask your care team if you should be screened for STIs.  After age 24: Get screened for  STIs if you're at risk. You are at risk for STIs (including HIV) if:  You are sexually active with more than one person.  You don't use condoms every time.  You or a partner was diagnosed with a sexually transmitted infection.  If you are at risk for HIV, ask about PrEP medicine to prevent HIV.  Get tested for HIV at least once in your life, whether you are at risk for HIV or not.  Cancer screening tests  Cervical cancer screening: If you have a cervix, begin getting regular cervical cancer screening tests starting at age 21.  Breast cancer scan (mammogram): If you've ever had breasts, begin having regular mammograms starting at age 40. This is a scan to check for breast cancer.  Colon cancer screening: It is important to start screening for colon cancer at age 45.  Have a colonoscopy test every 10 years (or more often if you're at risk) Or, ask your provider about stool tests like a FIT test every year or Cologuard test every 3 years.  To learn more about your testing options, visit:   [email or web address].  For help making a decision, visit:   https://[employer].77 Pieces/ya83789.  Prostate cancer screening test: If you have a prostate, ask your care team if a prostate cancer screening test (PSA) at age 55 is right for you.  Lung cancer screening: If you are a current or former smoker ages 50 to 80, ask your care team if ongoing lung cancer screenings are right for you.  For informational purposes only. Not to replace the advice of your health care provider. Copyright   2023 [facility]. All rights reserved. Clinically reviewed by the [facility] Transitions Program. [employer] [id number] - REV 01/24.
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
## 2024-02-13 ENCOUNTER — TELEPHONE (OUTPATIENT)
Dept: FAMILY MEDICINE | Facility: CLINIC | Age: 59
End: 2024-02-13
Payer: COMMERCIAL

## 2024-02-13 DIAGNOSIS — E78.5 DYSLIPIDEMIA: ICD-10-CM

## 2024-02-13 RX ORDER — ATORVASTATIN CALCIUM 20 MG/1
20 TABLET, FILM COATED ORAL AT BEDTIME
Qty: 90 TABLET | Refills: 3 | Status: SHIPPED | OUTPATIENT
Start: 2024-02-13

## 2024-02-13 NOTE — TELEPHONE ENCOUNTER
Pt asking to be notified when paperwork is completed so he can come to clinic and .      He had an office visit 2/12 with PCP and gave to provider at that time.        Alysia, RN    Triage Nurse  [facility]

## 2024-02-13 NOTE — TELEPHONE ENCOUNTER
TC called pt made aware that the forms are ready for      Placed at the      Kevin made for the chart     Alisia Scherer    [facility]

## 2024-06-05 DIAGNOSIS — Z91.030 BEE STING ALLERGY: ICD-10-CM

## 2024-06-05 RX ORDER — EPINEPHRINE 0.3 MG/.3ML
0.3 INJECTION SUBCUTANEOUS
Qty: 2 EACH | Refills: 0 | Status: SHIPPED | OUTPATIENT
Start: 2024-06-05

## 2024-07-10 NOTE — PROGRESS NOTES
"Discharge Note    Progress reporting period is from initial eval to Mar 29, 2017.     Tim failed to return for next follow up visit and current status is unknown.  Please see information below for last relevant information on current status.  Patient seen for Rxs Used: 8 visits.  SUBJECTIVE  Subjective changes noted by patient:  Subjective: Pt reports N/T is the same.  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  Pain improved over weekend and after seeing Chiro on 3/27.  Pt reports he meets with MD on Friday and is recommending continuing at different location to work on more \"aggressive strengthening\".  .  Current pain level is Current Pain level: 0/10.     Previous pain level was  Initial Pain level:  (3-8/10).   Changes in function:  Yes (See Goal flowsheet attached for changes in current functional level)  Adverse reaction to treatment or activity: None    OBJECTIVE  Changes noted in objective findings: Objective: C/S AROM:  Flex WNL; Ext Min loss/ERP; Rotation (R) WNL (L) Mod loss/ERP.  Ext and (L) Rotation improve after repeated ret/ext.  Myotomes:  WNL except for (L) thumb ext 5-/5.     ASSESSMENT/PLAN  Diagnosis: Neck pain - unilateral, asymmetrical, below elbow   DIAGP:  The encounter diagnosis was Neck pain.  Updated problem list and treatment plan:   Pain - HEP  Decreased ROM/flexibility - HEP  Decreased function - HEP  Decreased strength - HEP  Impaired posture - HEP  STG/LTGs have been met or progress has been made towards goals:  Yes, please see goal flowsheet for most current information  Assessment of Progress: current status is unknown.  Last current status: Assessment of progress: Pt is progressing slower than anticipated   Self Management Plans:  HEP  I have re-evaluated this patient and find that the nature, scope, duration and intensity of the therapy is appropriate for the medical condition of the patient.  Tim continues to require the following intervention " Pt returned call requesting to speak with RN. Please be advised to contact pt at cell number.    to meet STG and LTG's:  HEP.    Recommendations:  Discharge with current home program.  Patient to follow up with MD as needed.    Please refer to the daily flowsheet for treatment today, total treatment time and time spent performing 1:1 timed codes.

## 2024-09-30 NOTE — TELEPHONE ENCOUNTER
Received call from patient. Patient calling stating he is running out of his aspirin (ASA) 81 MG chewable tablet prescription.    Advised that 100 tablets was sent in to pharmacy on 2/12/2024, with 3 refills. Advised patient should have enough to last him over a year. Patient stating he was advised by pharmacy that he only has 1 refill left.     Called pharmacy to further clarify. They clarified that patient has 190 tablets left on script. Advised his insurance only covers to have a 30-day supply filled at once.     Called patient. Left detailed voice message on identified voicemail box regarding pharmacy's message, advised to return call at [phone] for any further questions.     VANESSA NúñezN ANTHONY  [facility]

## 2025-02-11 SDOH — HEALTH STABILITY: PHYSICAL HEALTH: ON AVERAGE, HOW MANY MINUTES DO YOU ENGAGE IN EXERCISE AT THIS LEVEL?: 60 MIN

## 2025-02-11 SDOH — HEALTH STABILITY: PHYSICAL HEALTH: ON AVERAGE, HOW MANY DAYS PER WEEK DO YOU ENGAGE IN MODERATE TO STRENUOUS EXERCISE (LIKE A BRISK WALK)?: 7 DAYS

## 2025-02-11 ASSESSMENT — SOCIAL DETERMINANTS OF HEALTH (SDOH): HOW OFTEN DO YOU GET TOGETHER WITH FRIENDS OR RELATIVES?: MORE THAN THREE TIMES A WEEK

## 2025-02-13 ENCOUNTER — OFFICE VISIT (OUTPATIENT)
Dept: FAMILY MEDICINE | Facility: CLINIC | Age: 60
End: 2025-02-13
Payer: COMMERCIAL

## 2025-02-13 VITALS
HEART RATE: 60 BPM | RESPIRATION RATE: 15 BRPM | WEIGHT: 192.8 LBS | OXYGEN SATURATION: 98 % | SYSTOLIC BLOOD PRESSURE: 130 MMHG | BODY MASS INDEX: 34.7 KG/M2 | DIASTOLIC BLOOD PRESSURE: 68 MMHG | TEMPERATURE: 98.5 F

## 2025-02-13 DIAGNOSIS — E78.5 DYSLIPIDEMIA: ICD-10-CM

## 2025-02-13 DIAGNOSIS — Z12.5 SCREENING FOR PROSTATE CANCER: ICD-10-CM

## 2025-02-13 DIAGNOSIS — Z00.00 ENCOUNTER FOR ANNUAL PHYSICAL EXAM: Primary | ICD-10-CM

## 2025-02-13 DIAGNOSIS — E66.01 MORBID OBESITY (H): ICD-10-CM

## 2025-02-13 DIAGNOSIS — I10 HYPERTENSION GOAL BP (BLOOD PRESSURE) < 140/90: ICD-10-CM

## 2025-02-13 RX ORDER — ASPIRIN 81 MG/1
81 TABLET ORAL DAILY
Qty: 100 TABLET | Refills: 3 | Status: SHIPPED | OUTPATIENT
Start: 2025-02-13 | End: 2025-02-13

## 2025-02-13 RX ORDER — ASPIRIN 81 MG/1
81 TABLET ORAL DAILY
Qty: 100 TABLET | Refills: 3 | Status: SHIPPED | OUTPATIENT
Start: 2025-02-13

## 2025-02-13 RX ORDER — METOPROLOL SUCCINATE 25 MG/1
25 TABLET, EXTENDED RELEASE ORAL DAILY
Qty: 90 TABLET | Refills: 3 | Status: SHIPPED | OUTPATIENT
Start: 2025-02-13

## 2025-02-13 RX ORDER — AMLODIPINE BESYLATE 10 MG/1
TABLET ORAL
Qty: 90 TABLET | Refills: 3 | Status: SHIPPED | OUTPATIENT
Start: 2025-02-13

## 2025-02-13 RX ORDER — ATORVASTATIN CALCIUM 20 MG/1
20 TABLET, FILM COATED ORAL AT BEDTIME
Qty: 90 TABLET | Refills: 3 | Status: SHIPPED | OUTPATIENT
Start: 2025-02-13

## 2025-02-13 RX ORDER — ASPIRIN 81 MG/1
81 TABLET, CHEWABLE ORAL DAILY
Qty: 100 TABLET | Refills: 3 | Status: SHIPPED | OUTPATIENT
Start: 2025-02-13 | End: 2025-02-13

## 2025-02-13 RX ORDER — ASPIRIN 81 MG/1
81 TABLET, CHEWABLE ORAL DAILY
Qty: 100 TABLET | Refills: 3 | Status: CANCELLED | OUTPATIENT
Start: 2025-02-13

## 2025-02-13 NOTE — PROGRESS NOTES
Preventive Care Visit  [facility]  Tyrell Solano MD, Family Medicine  Feb 13, 2025      Assessment & Plan     Encounter for annual physical exam   Discussed diet, exercise, wellness and other preventive recommendations related to health maintenance.   Follow up as needed for acute issues.    Physical exam recommended in one year.     Declined vaccines today.    Hypertension goal BP (blood pressure) < 140/90  Continue with current medicines  Discussed diet modification, low salt diet.  - Comprehensive metabolic panel (BMP + Alb, Alk Phos, ALT, AST, Total. Bili, TP); Future  - amLODIPine (NORVASC) 10 MG tablet; One tab daily  - metoprolol succinate ER (TOPROL XL) 25 MG 24 hr tablet; Take 1 tablet (25 mg) by mouth daily.  - aspirin 81 MG EC tablet; Take 1 tablet (81 mg) by mouth daily.    Dyslipidemia    - Lipid panel reflex to direct LDL Non-fasting; Future  - Comprehensive metabolic panel (BMP + Alb, Alk Phos, ALT, AST, Total. Bili, TP); Future  - atorvastatin (LIPITOR) 20 MG tablet; Take 1 tablet (20 mg) by mouth at bedtime. Not taken    Morbid obesity (H)  Discussed diet modifications  Weight loss, avoid late meals.    Screening for prostate cancer    - PSA, screen; Future    Patient has been advised of split billing requirements and indicates understanding: Yes        Counseling  Appropriate preventive services were addressed with this patient via screening, questionnaire, or discussion as appropriate for fall prevention, nutrition, physical activity, Tobacco-use cessation, social engagement, weight loss and cognition.  Checklist reviewing preventive services available has been given to the patient.  Reviewed patient's diet, addressing concerns and/or questions.   He is at risk for psychosocial distress and has been provided with information to reduce risk.       Work on weight loss  Regular exercise    Pratima Dumont is a 59 year old, presenting for the  following:  Physical    Comes for an annual exam.  History of dyslipidemia, hypertension.        2/13/2025     1:30 PM   Additional Questions   Roomed by brandi corral ma   Accompanied by self         2/13/2025     1:30 PM   Patient Reported Additional Medications   Patient reports taking the following new medications none            Patient has been taking metamucil for the fiber and it has sugar in it and he is wondering if it's beneficial to keep taking it.    Would like to switch to regular aspirin tabs instead of the chewable ones as they are sweet.      Health Care Directive  Patient does not have a Health Care Directive: Discussed advance care planning with patient; however, patient declined at this time.      2/11/2025   General Health   How would you rate your overall physical health? Good   Feel stress (tense, anxious, or unable to sleep) To some extent   (!) STRESS CONCERN      2/11/2025   Nutrition   Three or more servings of calcium each day? Yes   Diet: Low salt   How many servings of fruit and vegetables per day? 4 or more   How many sweetened beverages each day? 0-1         2/11/2025   Exercise   Days per week of moderate/strenous exercise 7 days   Average minutes spent exercising at this level 60 min         2/11/2025   Social Factors   Frequency of gathering with friends or relatives More than three times a week   Worry food won't last until get money to buy more No   Food not last or not have enough money for food? No   Do you have housing? (Housing is defined as stable permanent housing and does not include staying ouside in a car, in a tent, in an abandoned building, in an overnight shelter, or couch-surfing.) Yes   Are you worried about losing your housing? No   Lack of transportation? No   Unable to get utilities (heat,electricity)? No         2/11/2025   Fall Risk   Fallen 2 or more times in the past year? No   Trouble with walking or balance? No          2/11/2025   Dental   Dentist two times  every year? Yes         2024   TB Screening   Were you born outside of the [place]? No         Today's PHQ-2 Score:       2025     1:26 PM   PHQ-2 (  [employer])   Q1: Little interest or pleasure in doing things 0   Q2: Feeling down, depressed or hopeless 0   PHQ-2 Score 0    Q1: Little interest or pleasure in doing things Not at all   Q2: Feeling down, depressed or hopeless Not at all   PHQ-2 Score 0       Patient-reported           2025   Substance Use   Alcohol more than 3/day or more than 7/wk Not Applicable   Do you use any other substances recreationally? (!) PRESCRIPTION DRUGS     Social History     Tobacco Use    Smoking status: Never     Passive exposure: Never    Smokeless tobacco: Never   Vaping Use    Vaping status: Never Used   Substance Use Topics    Alcohol use: Not Currently     Comment: socially    Drug use: No     Comment: in the past used marijuana           2025   STI Screening   New sexual partner(s) since last STI/HIV test? No   Last PSA:   PSA   Date Value Ref Range Status   2020 1.04 0 - 4 ug/L Final     Comment:     Assay Method:  Chemiluminescence using Siemens Vista analyzer     Prostate Specific Antigen Screen   Date Value Ref Range Status   2024 1.37 0.00 - 4.50 ng/mL Final   2022 1.33 0.00 - 4.00 ug/L Final     ASCVD Risk   The 10-year ASCVD risk score (Jeevan BARBOUR, et al., 2019) is: 14.5%    Values used to calculate the score:      Age: 59 years      Sex: Male      Is Non- : No      Diabetic: No      Tobacco smoker: No      Systolic Blood Pressure: 143 mmHg      Is BP treated: Yes      HDL Cholesterol: 52 mg/dL      Total Cholesterol: 232 mg/dL    Fracture Risk Assessment Tool  Link to Frax Calculator  Use the information below to complete the Frax calculator  : 1965  Sex: male  Weight (kg): 87.5 kg (actual weight)  Height (cm): 0 cm  Previous Fragility Fracture:  No  History of parent with fractured hip:   "No  Current Smoking:  No  Patient has been on glucocorticoids for more than 3 months (5mg/day or more): No  Rheumatoid Arthritis on Problem List:  No  Secondary Osteoporosis on Problem List:  No  Consumes 3 or more units of alcohol per day: No  Femoral Neck BMD (g/cm2)           Reviewed and updated as needed this visit by Provider                    Past Medical History:   Diagnosis Date    DJD (degenerative joint disease)     BACK    High cholesterol     HTN (hypertension)     Hypertension goal BP (blood pressure) < 140/90 9/2/2011    Patient overweight     Seizures (H)      Past Surgical History:   Procedure Laterality Date    BIOPSY       OB History   No obstetric history on file.         Review of Systems  Constitutional, HEENT, cardiovascular, pulmonary, GI, , musculoskeletal, neuro, skin, endocrine and psych systems are negative, except as otherwise noted.     Objective    Exam  BP (!) 143/78 (BP Location: Right arm, Patient Position: Sitting, Cuff Size: Adult Large)   Pulse 60   Temp 98.5  F (36.9  C) (Oral)   Resp 15   Wt 87.5 kg (192 lb 12.8 oz)   SpO2 98%   BMI 34.70 kg/m     Estimated body mass index is 34.7 kg/m  as calculated from the following:    Height as of 2/12/24: 1.588 m (5' 2.5\").    Weight as of this encounter: 87.5 kg (192 lb 12.8 oz).    Physical Exam  GENERAL: alert and no distress  EYES: Eyes grossly normal to inspection, PERRL and conjunctivae and sclerae normal  HENT: ear canals and TM's normal, nose and mouth without ulcers or lesions  NECK: no adenopathy, no asymmetry, masses, or scars  RESP: lungs clear to auscultation - no rales, rhonchi or wheezes  CV: regular rate and rhythm, normal S1 S2, no S3 or S4, no murmur, click or rub, no peripheral edema  ABDOMEN: soft, nontender, no hepatosplenomegaly, no masses and bowel sounds normal  MS: no gross musculoskeletal defects noted, no edema  SKIN: no suspicious lesions or rashes  NEURO: Normal strength and tone, mentation intact and " speech normal  PSYCH: mentation appears normal, affect normal/bright    Orders Placed This Encounter   Procedures    REVIEW OF HEALTH MAINTENANCE PROTOCOL ORDERS    PSA, screen    Lipid panel reflex to direct LDL Non-fasting    Comprehensive metabolic panel (BMP + Alb, Alk Phos, ALT, AST, Total. Bili, TP)           Signed Electronically by: Tyrell Solano MD

## 2025-02-17 ENCOUNTER — TELEPHONE (OUTPATIENT)
Dept: FAMILY MEDICINE | Facility: CLINIC | Age: 60
End: 2025-02-17
Payer: COMMERCIAL

## 2025-02-17 NOTE — TELEPHONE ENCOUNTER
Patient/family called back and RN relayed provider's message. Patient/family verbalized understanding.     Rosanne RIZVI RN, BSN  [facility]: [place]

## 2025-02-17 NOTE — TELEPHONE ENCOUNTER
Attempt # 1 to reach patient.    Left VM to return call to clinic.    ----- Message from Tyrell Solano sent at 2/15/2025 10:15 AM CST -----  Please call pt with results    Normal for PSA, prostate cancer screening    Normal for Kidney and liver functions.  Normal for blood sugar and electrolyte.    Total Cholesterol normal, and LDL normal    Continue with current medicines.    Thanks    Christine M Klisch, RN